# Patient Record
Sex: FEMALE | Race: WHITE | Employment: OTHER | URBAN - METROPOLITAN AREA
[De-identification: names, ages, dates, MRNs, and addresses within clinical notes are randomized per-mention and may not be internally consistent; named-entity substitution may affect disease eponyms.]

---
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
## 2019-02-11 ENCOUNTER — OFFICE VISIT (OUTPATIENT)
Dept: ORTHOPEDIC SURGERY | Age: 19
End: 2019-02-11
Payer: COMMERCIAL

## 2019-02-11 VITALS
HEIGHT: 67 IN | DIASTOLIC BLOOD PRESSURE: 75 MMHG | SYSTOLIC BLOOD PRESSURE: 122 MMHG | WEIGHT: 145 LBS | BODY MASS INDEX: 22.76 KG/M2 | HEART RATE: 87 BPM

## 2019-02-11 DIAGNOSIS — M25.512 ACUTE PAIN OF LEFT SHOULDER: Primary | ICD-10-CM

## 2019-02-11 PROCEDURE — 99243 OFF/OP CNSLTJ NEW/EST LOW 30: CPT | Performed by: ORTHOPAEDIC SURGERY

## 2019-02-14 ENCOUNTER — HOSPITAL ENCOUNTER (OUTPATIENT)
Dept: PHYSICAL THERAPY | Age: 19
Setting detail: THERAPIES SERIES
Discharge: HOME | End: 2019-02-14
Payer: COMMERCIAL

## 2019-02-14 PROCEDURE — G8984 CARRY CURRENT STATUS: HCPCS

## 2019-02-14 PROCEDURE — G0283 ELEC STIM OTHER THAN WOUND: HCPCS

## 2019-02-14 PROCEDURE — G8985 CARRY GOAL STATUS: HCPCS

## 2019-02-14 PROCEDURE — 97110 THERAPEUTIC EXERCISES: CPT

## 2019-02-14 PROCEDURE — 97161 PT EVAL LOW COMPLEX 20 MIN: CPT

## 2019-02-18 ENCOUNTER — HOSPITAL ENCOUNTER (OUTPATIENT)
Dept: PHYSICAL THERAPY | Age: 19
Setting detail: THERAPIES SERIES
Discharge: HOME | End: 2019-02-18
Payer: COMMERCIAL

## 2019-02-18 PROCEDURE — 97110 THERAPEUTIC EXERCISES: CPT

## 2019-02-18 PROCEDURE — G0283 ELEC STIM OTHER THAN WOUND: HCPCS

## 2019-02-18 PROCEDURE — 97140 MANUAL THERAPY 1/> REGIONS: CPT

## 2019-02-20 ENCOUNTER — HOSPITAL ENCOUNTER (OUTPATIENT)
Dept: PHYSICAL THERAPY | Age: 19
Setting detail: THERAPIES SERIES
End: 2019-02-20
Payer: COMMERCIAL

## 2019-02-22 ENCOUNTER — HOSPITAL ENCOUNTER (OUTPATIENT)
Dept: PHYSICAL THERAPY | Age: 19
Setting detail: THERAPIES SERIES
Discharge: HOME | End: 2019-02-22
Payer: COMMERCIAL

## 2019-02-22 PROCEDURE — G0283 ELEC STIM OTHER THAN WOUND: HCPCS

## 2019-02-22 PROCEDURE — 97110 THERAPEUTIC EXERCISES: CPT

## 2019-02-22 PROCEDURE — 97140 MANUAL THERAPY 1/> REGIONS: CPT

## 2021-04-15 ENCOUNTER — APPOINTMENT (OUTPATIENT)
Dept: GENERAL RADIOLOGY | Age: 21
End: 2021-04-15
Payer: COMMERCIAL

## 2021-04-15 ENCOUNTER — HOSPITAL ENCOUNTER (EMERGENCY)
Age: 21
Discharge: HOME | End: 2021-04-15
Attending: STUDENT IN AN ORGANIZED HEALTH CARE EDUCATION/TRAINING PROGRAM
Payer: COMMERCIAL

## 2021-04-15 ENCOUNTER — APPOINTMENT (OUTPATIENT)
Dept: CT IMAGING | Age: 21
End: 2021-04-15
Payer: COMMERCIAL

## 2021-04-15 VITALS
DIASTOLIC BLOOD PRESSURE: 70 MMHG | HEART RATE: 84 BPM | RESPIRATION RATE: 16 BRPM | HEIGHT: 67 IN | SYSTOLIC BLOOD PRESSURE: 112 MMHG | WEIGHT: 140 LBS | TEMPERATURE: 98.4 F | OXYGEN SATURATION: 98 % | BODY MASS INDEX: 21.97 KG/M2

## 2021-04-15 DIAGNOSIS — R07.9 CHEST PAIN, UNSPECIFIED TYPE: Primary | ICD-10-CM

## 2021-04-15 LAB
A/G RATIO: 1.5 (ref 1.1–2.2)
ALBUMIN SERPL-MCNC: 4.8 G/DL (ref 3.4–5)
ALP BLD-CCNC: 37 U/L (ref 40–129)
ALT SERPL-CCNC: 14 U/L (ref 10–40)
ANION GAP SERPL CALCULATED.3IONS-SCNC: 10 MMOL/L (ref 3–16)
AST SERPL-CCNC: 18 U/L (ref 15–37)
BASOPHILS ABSOLUTE: 0 K/UL (ref 0–0.2)
BASOPHILS RELATIVE PERCENT: 0.5 %
BILIRUB SERPL-MCNC: 0.4 MG/DL (ref 0–1)
BUN BLDV-MCNC: 9 MG/DL (ref 7–20)
CALCIUM SERPL-MCNC: 10.2 MG/DL (ref 8.3–10.6)
CHLORIDE BLD-SCNC: 104 MMOL/L (ref 99–110)
CO2: 25 MMOL/L (ref 21–32)
CREAT SERPL-MCNC: 0.7 MG/DL (ref 0.6–1.1)
D DIMER: 245 NG/ML DDU (ref 0–229)
EKG ATRIAL RATE: 114 BPM
EKG DIAGNOSIS: NORMAL
EKG P AXIS: 76 DEGREES
EKG P-R INTERVAL: 118 MS
EKG Q-T INTERVAL: 324 MS
EKG QRS DURATION: 82 MS
EKG QTC CALCULATION (BAZETT): 446 MS
EKG R AXIS: 92 DEGREES
EKG T AXIS: 71 DEGREES
EKG VENTRICULAR RATE: 114 BPM
EOSINOPHILS ABSOLUTE: 0 K/UL (ref 0–0.6)
EOSINOPHILS RELATIVE PERCENT: 0.4 %
GFR AFRICAN AMERICAN: >60
GFR NON-AFRICAN AMERICAN: >60
GLOBULIN: 3.1 G/DL
GLUCOSE BLD-MCNC: 106 MG/DL (ref 70–99)
HCG QUALITATIVE: NEGATIVE
HCT VFR BLD CALC: 44.8 % (ref 36–48)
HEMOGLOBIN: 15 G/DL (ref 12–16)
LYMPHOCYTES ABSOLUTE: 1.8 K/UL (ref 1–5.1)
LYMPHOCYTES RELATIVE PERCENT: 20.2 %
MCH RBC QN AUTO: 28.7 PG (ref 26–34)
MCHC RBC AUTO-ENTMCNC: 33.4 G/DL (ref 31–36)
MCV RBC AUTO: 86 FL (ref 80–100)
MONOCYTES ABSOLUTE: 0.5 K/UL (ref 0–1.3)
MONOCYTES RELATIVE PERCENT: 5.5 %
NEUTROPHILS ABSOLUTE: 6.5 K/UL (ref 1.7–7.7)
NEUTROPHILS RELATIVE PERCENT: 73.4 %
PDW BLD-RTO: 12.5 % (ref 12.4–15.4)
PLATELET # BLD: 288 K/UL (ref 135–450)
PMV BLD AUTO: 9.1 FL (ref 5–10.5)
POTASSIUM SERPL-SCNC: 4.5 MMOL/L (ref 3.5–5.1)
RBC # BLD: 5.21 M/UL (ref 4–5.2)
SODIUM BLD-SCNC: 139 MMOL/L (ref 136–145)
TOTAL PROTEIN: 7.9 G/DL (ref 6.4–8.2)
TROPONIN: <0.01 NG/ML
WBC # BLD: 8.8 K/UL (ref 4–11)

## 2021-04-15 PROCEDURE — 85025 COMPLETE CBC W/AUTO DIFF WBC: CPT

## 2021-04-15 PROCEDURE — 36415 COLL VENOUS BLD VENIPUNCTURE: CPT

## 2021-04-15 PROCEDURE — 71046 X-RAY EXAM CHEST 2 VIEWS: CPT

## 2021-04-15 PROCEDURE — 6360000004 HC RX CONTRAST MEDICATION: Performed by: STUDENT IN AN ORGANIZED HEALTH CARE EDUCATION/TRAINING PROGRAM

## 2021-04-15 PROCEDURE — 2580000003 HC RX 258: Performed by: STUDENT IN AN ORGANIZED HEALTH CARE EDUCATION/TRAINING PROGRAM

## 2021-04-15 PROCEDURE — 93010 ELECTROCARDIOGRAM REPORT: CPT | Performed by: INTERNAL MEDICINE

## 2021-04-15 PROCEDURE — 96374 THER/PROPH/DIAG INJ IV PUSH: CPT

## 2021-04-15 PROCEDURE — 93005 ELECTROCARDIOGRAM TRACING: CPT | Performed by: STUDENT IN AN ORGANIZED HEALTH CARE EDUCATION/TRAINING PROGRAM

## 2021-04-15 PROCEDURE — 99283 EMERGENCY DEPT VISIT LOW MDM: CPT

## 2021-04-15 PROCEDURE — 84484 ASSAY OF TROPONIN QUANT: CPT

## 2021-04-15 PROCEDURE — 85379 FIBRIN DEGRADATION QUANT: CPT

## 2021-04-15 PROCEDURE — 84703 CHORIONIC GONADOTROPIN ASSAY: CPT

## 2021-04-15 PROCEDURE — 80053 COMPREHEN METABOLIC PANEL: CPT

## 2021-04-15 PROCEDURE — 6360000002 HC RX W HCPCS: Performed by: STUDENT IN AN ORGANIZED HEALTH CARE EDUCATION/TRAINING PROGRAM

## 2021-04-15 PROCEDURE — 71260 CT THORAX DX C+: CPT

## 2021-04-15 RX ORDER — 0.9 % SODIUM CHLORIDE 0.9 %
1000 INTRAVENOUS SOLUTION INTRAVENOUS ONCE
Status: COMPLETED | OUTPATIENT
Start: 2021-04-15 | End: 2021-04-15

## 2021-04-15 RX ORDER — KETOROLAC TROMETHAMINE 30 MG/ML
15 INJECTION, SOLUTION INTRAMUSCULAR; INTRAVENOUS ONCE
Status: COMPLETED | OUTPATIENT
Start: 2021-04-15 | End: 2021-04-15

## 2021-04-15 RX ADMIN — IOPAMIDOL 75 ML: 755 INJECTION, SOLUTION INTRAVENOUS at 15:55

## 2021-04-15 RX ADMIN — KETOROLAC TROMETHAMINE 15 MG: 30 INJECTION, SOLUTION INTRAMUSCULAR; INTRAVENOUS at 17:28

## 2021-04-15 RX ADMIN — SODIUM CHLORIDE 1000 ML: 9 INJECTION, SOLUTION INTRAVENOUS at 16:30

## 2021-04-15 ASSESSMENT — HEART SCORE: ECG: 0

## 2021-04-15 NOTE — ED NOTES
Discharge instructions reviewed with Pt. Pt verbalizes understanding at this time. VS as noted. Pt condition stable at this time. No concerns voiced.       Kayley Bennett RN  04/15/21 4247

## 2021-04-15 NOTE — ED NOTES
Discharge instructions reviewed with Pt. Pt verbalizes understanding at this time. VS as noted. Pt condition stable at this time. No concerns voiced.       Isela Ng RN  04/15/21 8394

## 2021-04-15 NOTE — ED PROVIDER NOTES
[facility] EMERGENCY DEPARTMENT      CHIEF COMPLAINT  Chest Pain (Chest pain intermittent over the past 3 days, pt also reports shortness of breath, and feeling lightheaded. Pt reports the lightheaded sensation she has experienced for an extended period of time. )       HISTORY OF PRESENT ILLNESS  Dimitri Hirsch is a 24 y.o. female  who presents to the ED complaining of left-sided chest pain and difficulty taking a deep breath. Patient states that the pain has been intermittent over the past 3 days, now exacerbating relieving factors. She states that she has mild dry cough but this is baseline for her. She denies any hemoptysis. She states that she has also had a small rash on the center of her chest, however this is been present for the past several months. She denies any exacerbating relieving factors. She has not taken medication for symptoms. She denies any other complaints or concerns. Denies leg pain or leg swelling. No other complaints, modifying factors or associated symptoms. I have reviewed the following from the nursing documentation. History reviewed. No pertinent past medical history. History reviewed. No pertinent surgical history. History reviewed. No pertinent family history.   Social History     Socioeconomic History    Marital status: Single     Spouse name: Not on file    Number of children: Not on file    Years of education: Not on file    Highest education level: Not on file   Occupational History    Not on file   Social Needs    Financial resource strain: Not on file    Food insecurity     Worry: Not on file     Inability: Not on file    Transportation needs     Medical: Not on file     Non-medical: Not on file   Tobacco Use    Smoking status: Current Every Day Smoker     Types: E-Cigarettes    Smokeless tobacco: Never Used   Substance and Sexual Activity    Alcohol use: Yes     Comment: occ    Drug use: Not Currently    Sexual activity: Not on file   Lifestyle  Physical activity     Days per week: Not on file     Minutes per session: Not on file    Stress: Not on file   Relationships    Social connections     Talks on phone: Not on file     Gets together: Not on file     Attends [employer] service: Not on file     Active member of club or organization: Not on file     Attends meetings of clubs or organizations: Not on file     Relationship status: Not on file    Intimate partner violence     Fear of current or ex partner: Not on file     Emotionally abused: Not on file     Physically abused: Not on file     Forced sexual activity: Not on file   Other Topics Concern    Not on file   Social History Narrative    Not on file     Current Facility-Administered Medications   Medication Dose Route Frequency Provider Last Rate Last Admin    0.9 % sodium chloride bolus  1,000 mL Intravenous Once Michelle Ramos MD 1,000 mL/hr at 04/15/21 1630 1,000 mL at 04/15/21 1630    ketorolac (TORADOL) injection 15 mg  15 mg Intravenous Once Michelle Ramos MD         Current Outpatient Medications   Medication Sig Dispense Refill    NONFORMULARY Birth control       No Known Allergies    REVIEW OF SYSTEMS  10 systems reviewed, pertinent positives per HPI otherwise noted to be negative. PHYSICAL EXAM  BP 99/71   Pulse 96   Temp 98.4 °F (36.9 °C) (Oral)   Resp 16   Ht 5' 7\" (1.702 m)   Wt 140 lb (63.5 kg)   LMP 04/01/2021   SpO2 99%   Breastfeeding Unknown   BMI 21.93 kg/m²    GENERAL APPEARANCE: Awake and alert. Cooperative. No acute distress. HENT: Normocephalic. Atraumatic. NECK: Supple. EYES: PERRL. EOM's grossly intact. HEART/CHEST: RRR. No murmurs. LUNGS: Respirations unlabored. CTAB. Good air exchange. Speaking comfortably in full sentences. ABDOMEN: No tenderness. Soft. Non-distended. No masses. No organomegaly. No guarding or rebound. MUSCULOSKELETAL: No extremity edema. Compartments soft. No deformity. No tenderness in the extremities.   All extremities neurovascularly intact. SKIN: Warm and dry. Small, raised rash on center of chest, no significant erythema, nontender, no petechiae or purpura. NEUROLOGICAL: Alert and oriented. CN's 2-12 intact. No gross facial drooping. Strength 5/5, sensation intact. PSYCHIATRIC: Normal mood and affect. LABS  I have reviewed all labs for this visit.    Results for orders placed or performed during the hospital encounter of 04/15/21   CBC Auto Differential   Result Value Ref Range    WBC 8.8 4.0 - 11.0 K/uL    RBC 5.21 (H) 4.00 - 5.20 M/uL    Hemoglobin 15.0 12.0 - 16.0 g/dL    Hematocrit 44.8 36.0 - 48.0 %    MCV 86.0 80.0 - 100.0 fL    MCH 28.7 26.0 - 34.0 pg    MCHC 33.4 31.0 - 36.0 g/dL    RDW 12.5 12.4 - 15.4 %    Platelets 194 947 - 628 K/uL    MPV 9.1 5.0 - 10.5 fL    Neutrophils % 73.4 %    Lymphocytes % 20.2 %    Monocytes % 5.5 %    Eosinophils % 0.4 %    Basophils % 0.5 %    Neutrophils Absolute 6.5 1.7 - 7.7 K/uL    Lymphocytes Absolute 1.8 1.0 - 5.1 K/uL    Monocytes Absolute 0.5 0.0 - 1.3 K/uL    Eosinophils Absolute 0.0 0.0 - 0.6 K/uL    Basophils Absolute 0.0 0.0 - 0.2 K/uL   Comprehensive Metabolic Panel   Result Value Ref Range    Sodium 139 136 - 145 mmol/L    Potassium 4.5 3.5 - 5.1 mmol/L    Chloride 104 99 - 110 mmol/L    CO2 25 21 - 32 mmol/L    Anion Gap 10 3 - 16    Glucose 106 (H) 70 - 99 mg/dL    BUN 9 7 - 20 mg/dL    CREATININE 0.7 0.6 - 1.1 mg/dL    GFR Non-African American >60 >60    GFR African American >60 >60    Calcium 10.2 8.3 - 10.6 mg/dL    Total Protein 7.9 6.4 - 8.2 g/dL    Albumin 4.8 3.4 - 5.0 g/dL    Albumin/Globulin Ratio 1.5 1.1 - 2.2    Total Bilirubin 0.4 0.0 - 1.0 mg/dL    Alkaline Phosphatase 37 (L) 40 - 129 U/L    ALT 14 10 - 40 U/L    AST 18 15 - 37 U/L    Globulin 3.1 g/dL   HCG Qualitative, Serum   Result Value Ref Range    hCG Qual Negative Detects HCG level >10 MIU/mL   Troponin   Result Value Ref Range    Troponin <0.01 <0.01 ng/mL   D-dimer, quantitative Result Value Ref Range    D-Dimer, Quant 245 (H) 0 - 229 ng/mL DDU       ECG  The Ekg interpreted by me shows  sinus tachycardia, tfjj=200   Axis is   Right axis deviation  QTc is  normal  Intervals and Durations are unremarkable. ST Segments: normal  No previous available for comparison    RADIOLOGY  CT CHEST PULMONARY EMBOLISM W CONTRAST   Final Result   No pulmonary embolus is identified. No acute cardiopulmonary disease. XR CHEST (2 VW)   Final Result   No acute cardiopulmonary disease. ED COURSE/MDM  Patient seen and evaluated. Old records reviewed. Labs and imaging reviewed and results discussed with patient. Patient is a 18-year-old female, presenting with concerns for intermittent substernal chest pain that sharp over the past 3 days, also with some shortness of breath and lightheadedness. Full HPI as detailed above. Upon arrival in the ED, vitals reassuring. Patient resting comfortably. She is in no acute distress. Heart regular rate and rhythm. Lungs clear to auscultation bilaterally. Patient is afebrile and appears nontoxic EKG shows sinus tachycardia and a right axis deviation but no evidence of acute ischemia or arrhythmias. Troponin negative. No acute concerning electrolyte abnormalities. No leukocytosis or anemia. D-dimer was found to be elevated, CT with PE protocol was performed which did not reveal any pulmonary embolus and no acute cardiopulmonary disease. Patient's heart score is 0. She was reassessed and stated that she was feeling well. I did offer her follow-up with primary care physician and she is comfortable in agreement with plan of care. She will be given referral.  He is given precautions for the ED.     During the patient's ED course, the patient was given:  Medications   0.9 % sodium chloride bolus (1,000 mLs Intravenous New Bag 4/15/21 1630)   ketorolac (TORADOL) injection 15 mg (has no administration in time range)   iopamidol (ISOVUE-370) 76 % injection 75 mL (75 mLs Intravenous Given 4/15/21 1565)        CLINICAL IMPRESSION  1. Chest pain, unspecified type        Blood pressure 99/71, pulse 96, temperature 98.4 °F (36.9 °C), temperature source Oral, resp. rate 16, height 5' 7\" (1.702 m), weight 140 lb (63.5 kg), last menstrual period 04/01/2021, SpO2 99 %, unknown if currently breastfeeding. DISPOSITION  Dez Hilario was discharged to home in good condition. Patient was given scripts for the following medications. I counseled patient how to take these medications. New Prescriptions    No medications on file       Follow-up with:  [facility]) Pre-Services  [phone]  Schedule an appointment as soon as possible for a visit       [facility]  [address] [place] [address],[address]  [phone]  Go to   If symptoms worsen      DISCLAIMER: This chart was created using Dragon dictation software. Efforts were made by me to ensure accuracy, however some errors may be present due to limitations of this technology and occasionally words are not transcribed correctly.        Vernon Lopez MD  04/15/21 5920

## 2021-09-14 LAB
ABO, EXTERNAL RESULT: NORMAL
C. TRACHOMATIS, EXTERNAL RESULT: NEGATIVE
HEP B, EXTERNAL RESULT: NEGATIVE
HEPATITIS C ANTIBODY, EXTERNAL RESULT: NEGATIVE
HIV, EXTERNAL RESULT: NON REACTIVE
N. GONORRHOEAE, EXTERNAL RESULT: NEGATIVE
RH FACTOR, EXTERNAL RESULT: POSITIVE
RUBELLA TITER, EXTERNAL RESULT: NORMAL

## 2022-03-16 ENCOUNTER — HOSPITAL ENCOUNTER (OUTPATIENT)
Age: 22
Discharge: HOME | End: 2022-03-17
Attending: OBSTETRICS & GYNECOLOGY | Admitting: OBSTETRICS & GYNECOLOGY
Payer: COMMERCIAL

## 2022-03-16 VITALS
BODY MASS INDEX: 30.76 KG/M2 | HEIGHT: 67 IN | WEIGHT: 196 LBS | HEART RATE: 116 BPM | TEMPERATURE: 98.4 F | DIASTOLIC BLOOD PRESSURE: 77 MMHG | SYSTOLIC BLOOD PRESSURE: 116 MMHG | RESPIRATION RATE: 18 BRPM

## 2022-03-16 PROCEDURE — 81003 URINALYSIS AUTO W/O SCOPE: CPT

## 2022-03-16 RX ORDER — SODIUM CHLORIDE, SODIUM LACTATE, POTASSIUM CHLORIDE, AND CALCIUM CHLORIDE .6; .31; .03; .02 G/100ML; G/100ML; G/100ML; G/100ML
500 INJECTION, SOLUTION INTRAVENOUS ONCE
Status: COMPLETED | OUTPATIENT
Start: 2022-03-16 | End: 2022-03-17

## 2022-03-17 PROBLEM — O47.00 THREATENED PRETERM LABOR: Status: ACTIVE | Noted: 2022-03-17

## 2022-03-17 LAB
BILIRUBIN URINE: NEGATIVE
BLOOD, URINE: NEGATIVE
CLARITY: CLEAR
COLOR: YELLOW
GLUCOSE URINE: NEGATIVE MG/DL
KETONES, URINE: NEGATIVE MG/DL
LEUKOCYTE ESTERASE, URINE: NEGATIVE
MICROSCOPIC EXAMINATION: NORMAL
NITRITE, URINE: NEGATIVE
PH UA: 6 (ref 5–8)
PROTEIN UA: NEGATIVE MG/DL
SPECIFIC GRAVITY UA: <=1.005 (ref 1–1.03)
URINE TYPE: NORMAL
UROBILINOGEN, URINE: 0.2 E.U./DL

## 2022-03-17 PROCEDURE — 2580000003 HC RX 258: Performed by: OBSTETRICS & GYNECOLOGY

## 2022-03-17 PROCEDURE — 99211 OFF/OP EST MAY X REQ PHY/QHP: CPT

## 2022-03-17 PROCEDURE — 96361 HYDRATE IV INFUSION ADD-ON: CPT

## 2022-03-17 PROCEDURE — 6360000002 HC RX W HCPCS: Performed by: OBSTETRICS & GYNECOLOGY

## 2022-03-17 PROCEDURE — 96360 HYDRATION IV INFUSION INIT: CPT

## 2022-03-17 RX ORDER — DEXTROSE, SODIUM CHLORIDE, SODIUM LACTATE, POTASSIUM CHLORIDE, AND CALCIUM CHLORIDE 5; .6; .31; .03; .02 G/100ML; G/100ML; G/100ML; G/100ML; G/100ML
INJECTION, SOLUTION INTRAVENOUS CONTINUOUS
Status: DISCONTINUED | OUTPATIENT
Start: 2022-03-17 | End: 2022-03-17 | Stop reason: HOSPADM

## 2022-03-17 RX ORDER — BETAMETHASONE SODIUM PHOSPHATE AND BETAMETHASONE ACETATE 3; 3 MG/ML; MG/ML
12 INJECTION, SUSPENSION INTRA-ARTICULAR; INTRALESIONAL; INTRAMUSCULAR; SOFT TISSUE EVERY 24 HOURS
Status: DISCONTINUED | OUTPATIENT
Start: 2022-03-17 | End: 2022-03-17 | Stop reason: HOSPADM

## 2022-03-17 RX ADMIN — SODIUM CHLORIDE, POTASSIUM CHLORIDE, SODIUM LACTATE AND CALCIUM CHLORIDE 500 ML: 600; 310; 30; 20 INJECTION, SOLUTION INTRAVENOUS at 00:06

## 2022-03-17 RX ADMIN — SODIUM CHLORIDE, SODIUM LACTATE, POTASSIUM CHLORIDE, CALCIUM CHLORIDE AND DEXTROSE MONOHYDRATE: 5; 600; 310; 30; 20 INJECTION, SOLUTION INTRAVENOUS at 02:07

## 2022-03-17 RX ADMIN — BETAMETHASONE SODIUM PHOSPHATE AND BETAMETHASONE ACETATE 12 MG: 3; 3 INJECTION, SUSPENSION INTRA-ARTICULAR; INTRALESIONAL; INTRAMUSCULAR at 00:43

## 2022-03-17 NOTE — PROGRESS NOTES
Dr. Damaso Donaldson at bedside. Cervix unchanged. Patient to be discharged home, then follow up at appointment at 0900 Friday at [facility] in [place]. She said to come here before the appointment to get celestone injection at 0800.  Patient taken off monitor at this time

## 2022-03-17 NOTE — PROGRESS NOTES
House Physician Progress Note    S:   Called to evaluate patient as attending physician in delivery and desire to promptly rule out  labor/ROM. Patient reports regular contractions that started at 13:30 this afternoon. States they started every 15-20 minutes and have progressively gotten closer together. Reports over the last hour she has noticed that she is more \"wet\" vaginally. Denies gushes of fluid or need to wear a pad. +FM. Reports pregnancy is complicated by polyhydramnios. O:   Exam:   GEN: NAD  Lungs: Respirations unlabored  Abd: Soft, non-tender, non-distended. No rebound or guarding. No uterine tenderness to palpation. Pelvic: Normal appearing external genitalia. Negative ferning and pooling. Cervix /-3    Fetal Monitoring Interpretation:   Baseline: 140-150 (Wandering baseline)  Variability: moderate in degree  Accelerations: Present  Decelerations: Absent                  [place]: Contractions are present every 2-3 minutes    Category 1    Reactive: Yes    A:   1. Markell Bales is a 20 y.o.  at 34w3d   2. Membranes intact on exam  3. Polyhydramnios  4. Fetal wellbeing: NST reactive    P:   Discussed with Dr. Damaso Donaldson who is here to personally evaluate patient.      Priscila Jensen,

## 2022-03-17 NOTE — PROGRESS NOTES
Dr. Mario Madera notified that IV bolus was finished running and that patient stated her contractions felt like they were getting stronger

## 2022-03-17 NOTE — PROGRESS NOTES
Pt arrives to triage with complaints of CTX that began around 1300 but have gotten stronger and Q3 since 2000. Pt also states that in the last hour she \"feels more wet\" but denies large gush of flush. Pt states she is poly and ESTELA was 29 at yesterdays ultrasound. CTX palpate mild/moderate and pt able to talk through some and breathe through others. Denies VB or recent sexual intercourse. States +FM.  in house but in delivery.  States to have house doc perform Meljose and VE.

## 2022-03-17 NOTE — PROGRESS NOTES
Pt verbalized understanding of verbal and written discharge instructions and denies having questions at this time. Pt left OB unit at 0439 ambulatory, undelivered, and in stable condition, accompanied by FOB. Patient is not in active labor. Celestone vial labeled with patient sticker and placed in [address] drop box for when patient returns for 2nd injection.

## 2022-03-17 NOTE — H&P
Department of Obstetrics and Gynecology  Labor and Delivery  Attending Triage Note      SUBJECTIVE:  contractions    OBJECTIVE    22y  at 34w4d presented to L&D eliezer after calling answering service at 2030 c/o ctx since 1300. Pt was advised to take tylenol 1g and if no improvement to call back,  She called back at 2130 reporting increasing frequency and intensity q3-4 mins. She also reports urinary sx of incomplete emptying and frequency. Unsure if having LOF. Reports good FM, +CTX, VB. Vitals:  Vitals:    22 2238 22   BP: 116/77    Pulse: 116    Resp: 18    Temp: 98.4 °F (36.9 °C)    TempSrc: Oral    Weight:  196 lb (88.9 kg)   Height:  5' 7\" (1.702 m)       CONSTITUTIONAL:  awake, alert, cooperative, no apparent distress, and appears stated age  ABDOMEN:  Soft non tender  MUSCULOSKELETAL:  Full range of motion    Cervix:  20/-3 (Exam per House MD/), fern neg    Membranes:  Intact    Fetal heart rate:         Baseline Heart Rate:  150 bpm       Accelerations:  present       Decelerations:  absent       Variability:  moderate    Contraction frequency: q3-4 minutes      DATA:  Results for orders placed or performed during the hospital encounter of 22   Urinalysis   Result Value Ref Range    Urine Type NotGiven      Pending       ASSESSMENT & PLAN:      IUP at 34.4 weeks   contractions  No evidence of ruptured membranes  Reassuring fetal status     1. Pt evaluated and cervix 1/L/H fern neg, pt states discomfort minimal.  Suspect threatened  labor. IVF started. Send UA to r/o dehydration vs UTI as cause of sx as well- treat prn. Plan Celestone 12mg x1 IM and repeat 2nd dose in 24h. Plan cervical recheck in 2 hours with SVE. Monitor closely.        Lane Weiner DO

## 2022-03-18 ENCOUNTER — HOSPITAL ENCOUNTER (OUTPATIENT)
Age: 22
Discharge: HOME | End: 2022-03-18
Attending: OBSTETRICS & GYNECOLOGY | Admitting: OBSTETRICS & GYNECOLOGY
Payer: COMMERCIAL

## 2022-03-18 PROCEDURE — 99211 OFF/OP EST MAY X REQ PHY/QHP: CPT

## 2022-03-18 PROCEDURE — 96372 THER/PROPH/DIAG INJ SC/IM: CPT

## 2022-03-18 PROCEDURE — 6360000002 HC RX W HCPCS: Performed by: OBSTETRICS & GYNECOLOGY

## 2022-03-18 RX ORDER — BETAMETHASONE SODIUM PHOSPHATE AND BETAMETHASONE ACETATE 3; 3 MG/ML; MG/ML
12 INJECTION, SUSPENSION INTRA-ARTICULAR; INTRALESIONAL; INTRAMUSCULAR; SOFT TISSUE EVERY 24 HOURS
Status: DISCONTINUED | OUTPATIENT
Start: 2022-03-18 | End: 2022-03-18 | Stop reason: HOSPADM

## 2022-03-18 RX ADMIN — BETAMETHASONE ACETATE AND BETAMETHASONE SODIUM PHOSPHATE 12 MG: 3; 3 INJECTION, SUSPENSION INTRA-ARTICULAR; INTRALESIONAL; INTRAMUSCULAR; SOFT TISSUE at 08:12

## 2022-03-18 NOTE — PROGRESS NOTES
Pt here for second Celestone shot. Given in right GM. Tolerated well.    0800 Discharged not in labor. Pt states she has OB appointment with NST this morning.
No

## 2022-03-24 LAB — GBS, EXTERNAL RESULT: NEGATIVE

## 2022-03-31 LAB — RPR, EXTERNAL RESULT: NON REACTIVE

## 2022-04-10 ENCOUNTER — ANESTHESIA (OUTPATIENT)
Dept: LABOR AND DELIVERY | Age: 22
End: 2022-04-10
Payer: COMMERCIAL

## 2022-04-10 ENCOUNTER — ANESTHESIA EVENT (OUTPATIENT)
Dept: LABOR AND DELIVERY | Age: 22
End: 2022-04-10
Payer: COMMERCIAL

## 2022-04-10 ENCOUNTER — HOSPITAL ENCOUNTER (INPATIENT)
Age: 22
LOS: 2 days | Discharge: HOME | End: 2022-04-12
Attending: OBSTETRICS & GYNECOLOGY | Admitting: OBSTETRICS & GYNECOLOGY
Payer: COMMERCIAL

## 2022-04-10 PROBLEM — Z37.9 NORMAL LABOR: Status: ACTIVE | Noted: 2022-04-10

## 2022-04-10 LAB
ABO/RH: NORMAL
AMPHETAMINE SCREEN, URINE: ABNORMAL
ANTIBODY SCREEN: NORMAL
BARBITURATE SCREEN URINE: ABNORMAL
BASOPHILS ABSOLUTE: 0.1 K/UL (ref 0–0.2)
BASOPHILS RELATIVE PERCENT: 0.6 %
BENZODIAZEPINE SCREEN, URINE: ABNORMAL
BUPRENORPHINE URINE: ABNORMAL
CANNABINOID SCREEN URINE: POSITIVE
COCAINE METABOLITE SCREEN URINE: ABNORMAL
EOSINOPHILS ABSOLUTE: 0 K/UL (ref 0–0.6)
EOSINOPHILS RELATIVE PERCENT: 0.3 %
HCT VFR BLD CALC: 34.7 % (ref 36–48)
HEMOGLOBIN: 11.2 G/DL (ref 12–16)
LYMPHOCYTES ABSOLUTE: 2.1 K/UL (ref 1–5.1)
LYMPHOCYTES RELATIVE PERCENT: 15.1 %
Lab: ABNORMAL
MCH RBC QN AUTO: 26.4 PG (ref 26–34)
MCHC RBC AUTO-ENTMCNC: 32.3 G/DL (ref 31–36)
MCV RBC AUTO: 81.5 FL (ref 80–100)
METHADONE SCREEN, URINE: ABNORMAL
MONOCYTES ABSOLUTE: 0.7 K/UL (ref 0–1.3)
MONOCYTES RELATIVE PERCENT: 5.1 %
NEUTROPHILS ABSOLUTE: 11.1 K/UL (ref 1.7–7.7)
NEUTROPHILS RELATIVE PERCENT: 78.9 %
OPIATE SCREEN URINE: ABNORMAL
OXYCODONE URINE: ABNORMAL
PDW BLD-RTO: 15.7 % (ref 12.4–15.4)
PH UA: 4
PHENCYCLIDINE SCREEN URINE: ABNORMAL
PLATELET # BLD: 339 K/UL (ref 135–450)
PMV BLD AUTO: 9.9 FL (ref 5–10.5)
PROPOXYPHENE SCREEN: ABNORMAL
RBC # BLD: 4.25 M/UL (ref 4–5.2)
SARS-COV-2, NAAT: NOT DETECTED
WBC # BLD: 14 K/UL (ref 4–11)

## 2022-04-10 PROCEDURE — 87635 SARS-COV-2 COVID-19 AMP PRB: CPT

## 2022-04-10 PROCEDURE — 0KQM0ZZ REPAIR PERINEUM MUSCLE, OPEN APPROACH: ICD-10-PCS | Performed by: OBSTETRICS & GYNECOLOGY

## 2022-04-10 PROCEDURE — 86780 TREPONEMA PALLIDUM: CPT

## 2022-04-10 PROCEDURE — 85025 COMPLETE CBC W/AUTO DIFF WBC: CPT

## 2022-04-10 PROCEDURE — 86850 RBC ANTIBODY SCREEN: CPT

## 2022-04-10 PROCEDURE — 2500000003 HC RX 250 WO HCPCS: Performed by: NURSE ANESTHETIST, CERTIFIED REGISTERED

## 2022-04-10 PROCEDURE — 86900 BLOOD TYPING SEROLOGIC ABO: CPT

## 2022-04-10 PROCEDURE — 86901 BLOOD TYPING SEROLOGIC RH(D): CPT

## 2022-04-10 PROCEDURE — 7200000001 HC VAGINAL DELIVERY

## 2022-04-10 PROCEDURE — 1220000000 HC SEMI PRIVATE OB R&B

## 2022-04-10 PROCEDURE — 6370000000 HC RX 637 (ALT 250 FOR IP): Performed by: OBSTETRICS & GYNECOLOGY

## 2022-04-10 PROCEDURE — 3700000025 EPIDURAL BLOCK: Performed by: ANESTHESIOLOGY

## 2022-04-10 PROCEDURE — 80307 DRUG TEST PRSMV CHEM ANLYZR: CPT

## 2022-04-10 PROCEDURE — 2580000003 HC RX 258: Performed by: OBSTETRICS & GYNECOLOGY

## 2022-04-10 RX ORDER — SODIUM CHLORIDE 9 MG/ML
25 INJECTION, SOLUTION INTRAVENOUS PRN
Status: DISCONTINUED | OUTPATIENT
Start: 2022-04-10 | End: 2022-04-10

## 2022-04-10 RX ORDER — ACETAMINOPHEN 500 MG
1000 TABLET ORAL EVERY 6 HOURS
Status: DISCONTINUED | OUTPATIENT
Start: 2022-04-10 | End: 2022-04-12 | Stop reason: HOSPADM

## 2022-04-10 RX ORDER — SODIUM CHLORIDE, SODIUM LACTATE, POTASSIUM CHLORIDE, CALCIUM CHLORIDE 600; 310; 30; 20 MG/100ML; MG/100ML; MG/100ML; MG/100ML
INJECTION, SOLUTION INTRAVENOUS CONTINUOUS
Status: DISCONTINUED | OUTPATIENT
Start: 2022-04-10 | End: 2022-04-10

## 2022-04-10 RX ORDER — BUPIVACAINE HYDROCHLORIDE 2.5 MG/ML
INJECTION, SOLUTION EPIDURAL; INFILTRATION; INTRACAUDAL
Status: COMPLETED
Start: 2022-04-10 | End: 2022-04-10

## 2022-04-10 RX ORDER — SODIUM CHLORIDE 0.9 % (FLUSH) 0.9 %
5-40 SYRINGE (ML) INJECTION PRN
Status: DISCONTINUED | OUTPATIENT
Start: 2022-04-10 | End: 2022-04-10

## 2022-04-10 RX ORDER — BUPIVACAINE HYDROCHLORIDE 2.5 MG/ML
INJECTION, SOLUTION EPIDURAL; INFILTRATION; INTRACAUDAL PRN
Status: DISCONTINUED | OUTPATIENT
Start: 2022-04-10 | End: 2022-04-10 | Stop reason: SDUPTHER

## 2022-04-10 RX ORDER — SODIUM CHLORIDE, SODIUM LACTATE, POTASSIUM CHLORIDE, AND CALCIUM CHLORIDE .6; .31; .03; .02 G/100ML; G/100ML; G/100ML; G/100ML
500 INJECTION, SOLUTION INTRAVENOUS PRN
Status: DISCONTINUED | OUTPATIENT
Start: 2022-04-10 | End: 2022-04-10

## 2022-04-10 RX ORDER — SODIUM CHLORIDE, SODIUM LACTATE, POTASSIUM CHLORIDE, CALCIUM CHLORIDE 600; 310; 30; 20 MG/100ML; MG/100ML; MG/100ML; MG/100ML
INJECTION, SOLUTION INTRAVENOUS CONTINUOUS
Status: ACTIVE | OUTPATIENT
Start: 2022-04-10 | End: 2022-04-10

## 2022-04-10 RX ORDER — SODIUM CHLORIDE 0.9 % (FLUSH) 0.9 %
5-40 SYRINGE (ML) INJECTION EVERY 12 HOURS SCHEDULED
Status: DISCONTINUED | OUTPATIENT
Start: 2022-04-10 | End: 2022-04-12 | Stop reason: HOSPADM

## 2022-04-10 RX ORDER — ONDANSETRON 2 MG/ML
4 INJECTION INTRAMUSCULAR; INTRAVENOUS EVERY 6 HOURS PRN
Status: DISCONTINUED | OUTPATIENT
Start: 2022-04-10 | End: 2022-04-10

## 2022-04-10 RX ORDER — SODIUM CHLORIDE, SODIUM LACTATE, POTASSIUM CHLORIDE, AND CALCIUM CHLORIDE .6; .31; .03; .02 G/100ML; G/100ML; G/100ML; G/100ML
1000 INJECTION, SOLUTION INTRAVENOUS PRN
Status: DISCONTINUED | OUTPATIENT
Start: 2022-04-10 | End: 2022-04-10

## 2022-04-10 RX ORDER — FAMOTIDINE 20 MG/1
20 TABLET, FILM COATED ORAL 2 TIMES DAILY
Status: DISCONTINUED | OUTPATIENT
Start: 2022-04-10 | End: 2022-04-12 | Stop reason: HOSPADM

## 2022-04-10 RX ORDER — DOCUSATE SODIUM 100 MG/1
100 CAPSULE, LIQUID FILLED ORAL 2 TIMES DAILY
Status: DISCONTINUED | OUTPATIENT
Start: 2022-04-10 | End: 2022-04-12 | Stop reason: HOSPADM

## 2022-04-10 RX ORDER — LANOLIN 100 %
OINTMENT (GRAM) TOPICAL PRN
Status: DISCONTINUED | OUTPATIENT
Start: 2022-04-10 | End: 2022-04-12 | Stop reason: HOSPADM

## 2022-04-10 RX ORDER — SODIUM CHLORIDE 9 MG/ML
25 INJECTION, SOLUTION INTRAVENOUS PRN
Status: DISCONTINUED | OUTPATIENT
Start: 2022-04-10 | End: 2022-04-12 | Stop reason: HOSPADM

## 2022-04-10 RX ORDER — ACETAMINOPHEN 325 MG/1
650 TABLET ORAL EVERY 4 HOURS PRN
Status: DISCONTINUED | OUTPATIENT
Start: 2022-04-10 | End: 2022-04-10

## 2022-04-10 RX ORDER — IBUPROFEN 800 MG/1
800 TABLET ORAL EVERY 8 HOURS
Status: DISCONTINUED | OUTPATIENT
Start: 2022-04-11 | End: 2022-04-12 | Stop reason: HOSPADM

## 2022-04-10 RX ORDER — SODIUM CHLORIDE 0.9 % (FLUSH) 0.9 %
5-40 SYRINGE (ML) INJECTION EVERY 12 HOURS SCHEDULED
Status: DISCONTINUED | OUTPATIENT
Start: 2022-04-10 | End: 2022-04-10

## 2022-04-10 RX ORDER — BUPIVACAINE HYDROCHLORIDE 5 MG/ML
INJECTION, SOLUTION EPIDURAL; INTRACAUDAL
Status: DISCONTINUED
Start: 2022-04-10 | End: 2022-04-10

## 2022-04-10 RX ORDER — DIPHENHYDRAMINE HCL 25 MG
25 TABLET ORAL EVERY 4 HOURS PRN
Status: DISCONTINUED | OUTPATIENT
Start: 2022-04-10 | End: 2022-04-10

## 2022-04-10 RX ORDER — SODIUM CHLORIDE 0.9 % (FLUSH) 0.9 %
5-40 SYRINGE (ML) INJECTION PRN
Status: DISCONTINUED | OUTPATIENT
Start: 2022-04-10 | End: 2022-04-12 | Stop reason: HOSPADM

## 2022-04-10 RX ADMIN — Medication 10 ML: at 20:45

## 2022-04-10 RX ADMIN — Medication 500 ML: at 10:47

## 2022-04-10 RX ADMIN — ACETAMINOPHEN 1000 MG: 500 TABLET ORAL at 15:36

## 2022-04-10 RX ADMIN — ACETAMINOPHEN 1000 MG: 500 TABLET ORAL at 20:43

## 2022-04-10 RX ADMIN — SODIUM CHLORIDE, POTASSIUM CHLORIDE, SODIUM LACTATE AND CALCIUM CHLORIDE: 600; 310; 30; 20 INJECTION, SOLUTION INTRAVENOUS at 06:19

## 2022-04-10 RX ADMIN — BUPIVACAINE HYDROCHLORIDE 2 ML: 2.5 INJECTION, SOLUTION EPIDURAL; INFILTRATION; INTRACAUDAL; PERINEURAL at 07:13

## 2022-04-10 RX ADMIN — Medication 15 ML/HR: at 07:18

## 2022-04-10 RX ADMIN — SODIUM CHLORIDE, POTASSIUM CHLORIDE, SODIUM LACTATE AND CALCIUM CHLORIDE: 600; 310; 30; 20 INJECTION, SOLUTION INTRAVENOUS at 08:00

## 2022-04-10 RX ADMIN — SODIUM CHLORIDE, POTASSIUM CHLORIDE, SODIUM LACTATE AND CALCIUM CHLORIDE: 600; 310; 30; 20 INJECTION, SOLUTION INTRAVENOUS at 06:30

## 2022-04-10 RX ADMIN — DOCUSATE SODIUM 100 MG: 100 CAPSULE, LIQUID FILLED ORAL at 20:45

## 2022-04-10 ASSESSMENT — PAIN DESCRIPTION - PROGRESSION: CLINICAL_PROGRESSION: GRADUALLY WORSENING

## 2022-04-10 ASSESSMENT — PAIN SCALES - GENERAL
PAINLEVEL_OUTOF10: 10
PAINLEVEL_OUTOF10: 2

## 2022-04-10 ASSESSMENT — PAIN DESCRIPTION - PAIN TYPE: TYPE: ACUTE PAIN

## 2022-04-10 ASSESSMENT — PAIN DESCRIPTION - ORIENTATION: ORIENTATION: LOWER

## 2022-04-10 ASSESSMENT — PAIN - FUNCTIONAL ASSESSMENT: PAIN_FUNCTIONAL_ASSESSMENT: ACTIVITIES ARE NOT PREVENTED

## 2022-04-10 ASSESSMENT — PAIN DESCRIPTION - LOCATION: LOCATION: ABDOMEN

## 2022-04-10 ASSESSMENT — PAIN DESCRIPTION - FREQUENCY: FREQUENCY: INTERMITTENT

## 2022-04-10 ASSESSMENT — PAIN DESCRIPTION - ONSET: ONSET: ON-GOING

## 2022-04-10 ASSESSMENT — PAIN DESCRIPTION - DESCRIPTORS: DESCRIPTORS: STABBING

## 2022-04-10 NOTE — PROGRESS NOTES
Bedside report from Jenaro [place] RN for safe transfer of care. Pt taken by wheel chair to room 378 with FOB. Patient is alert, oriented and utilizing comfort measures for labor support. Family/support person at bedside. Room equipment checked and prepared for delivery. RN info updated on white board. Will continue to monitor.

## 2022-04-10 NOTE — PLAN OF CARE
Problem: Anxiety:  Goal: Level of anxiety will decrease  Outcome: Ongoing     Problem: Breathing Pattern - Ineffective:  Goal: Able to breathe comfortably  Outcome: Ongoing     Problem: Fluid Volume - Imbalance:  Goal: Absence of imbalanced fluid volume signs and symptoms  Outcome: Ongoing  Goal: Absence of intrapartum hemorrhage signs and symptoms  Outcome: Ongoing     Problem: Infection - Intrapartum Infection:  Goal: Will show no infection signs and symptoms  Outcome: Ongoing     Problem: Labor Process - Prolonged:  Goal: Labor progression, first stage, within specified pattern  Outcome: Ongoing  Goal: Labor progession, second stage, within specified pattern  Outcome: Ongoing  Goal: Uterine contractions within specified parameters  Outcome: Ongoing     Problem:  Screening:  Goal: Ability to make informed decisions regarding treatment has improved  Outcome: Ongoing     Problem: Pain - Acute:  Goal: Pain level will decrease  Outcome: Ongoing  Goal: Able to cope with pain  Outcome: Ongoing     Problem: Tissue Perfusion - Uteroplacental, Altered:  Goal: Absence of abnormal fetal heart rate pattern  Outcome: Ongoing     Problem: Urinary Retention:  Goal: Experiences of bladder distention will decrease  Outcome: Ongoing  Goal: Urinary elimination within specified parameters  Outcome: Ongoing

## 2022-04-10 NOTE — ANESTHESIA POSTPROCEDURE EVALUATION
Department of Anesthesiology  Postprocedure Note    Patient: Estee Freeman  MRN: [id number]  YOB: 2000  Date of evaluation: 4/10/2022  Time:  4:08 PM     Procedure Summary     Date: 04/10/22 Room / Location:     Anesthesia Start: 9312 Anesthesia Stop: 1034    Procedure: Labor Analgesia Diagnosis:     Scheduled Providers:  Responsible Provider: Rafat Luna MD    Anesthesia Type: epidural ASA Status: 2 - Emergent          Anesthesia Type: epidural    James Phase I:      James Phase II:      Last vitals: Reviewed and per EMR flowsheets. Anesthesia Post Evaluation    Patient location during evaluation: bedside  Patient participation: complete - patient cannot participate  Level of consciousness: awake and alert  Pain score: 0  Complications: no  Respiratory status: acceptable  Comments: Patient denies headache, nausea at this time.

## 2022-04-10 NOTE — ANESTHESIA PRE PROCEDURE
Department of Anesthesiology  Preprocedure Note       Name:  Perez Kapoor   Age:  20 y.o.  :  2000                                          MRN:  [id number]         Date:  4/10/2022      Surgeon: * No surgeons listed *    Procedure: * No procedures listed *    Medications prior to admission:   Prior to Admission medications    Medication Sig Start Date End Date Taking?  Authorizing Provider   Prenatal Vit-Fe Fumarate-FA (PRENATAL 1+1 PO) Take 1 tablet by mouth daily    Historical Provider, MD       Current medications:    Current Facility-Administered Medications   Medication Dose Route Frequency Provider Last Rate Last Admin    lactated ringers infusion   IntraVENous Continuous Parvin Gutierrez  mL/hr at 04/10/22 0630 New Bag at 04/10/22 0630    lactated ringers bolus  500 mL IntraVENous PRN Parvin Gutierrez MD        Or    lactated ringers bolus  1,000 mL IntraVENous PRN Parvin Gutierrez MD        sodium chloride flush 0.9 % injection 5-40 mL  5-40 mL IntraVENous 2 times per day Parvin Gutierrez MD        sodium chloride flush 0.9 % injection 5-40 mL  5-40 mL IntraVENous PRN Parvin Gutierrez MD        0.9 % sodium chloride infusion  25 mL IntraVENous PRN Parvin Gutierrez MD        ondansetron [facility]) injection 4 mg  4 mg IntraVENous Q6H PRN Parvin Gutierrez MD        diphenhydrAMINE (BENADRYL) tablet 25 mg  25 mg Oral Q4H PRN Parvin Gutierrez MD        acetaminophen (TYLENOL) tablet 650 mg  650 mg Oral Q4H PRN Parvin Gutierrez MD        bupivacaine (PF) (MARCAINE) 0.5 % injection              Facility-Administered Medications Ordered in Other Encounters   Medication Dose Route Frequency Provider Last Rate Last Admin    bupivacaine (PF) (MARCAINE) 0.25 % injection   IntraSPINal PRN Ary Christy, APRN - CRNA   2 mL at 04/10/22 0713    sodium chloride 0.9 % 200 mL with fentaNYL 500 mcg, bupivacaine 0.5% 50 mL (OB) epidural   Epidural Continuous PRN Ary Bethg, APRN - CRNA 15 mL/hr at 04/10/22 0718 15 mL/hr at 04/10/22 0718       Allergies:  No Known Allergies    Problem List:    Patient Active Problem List   Diagnosis Code    Threatened  labor O47.00    Normal labor O80, Z37.9       Past Medical History:        Diagnosis Date    Asthma     excercise induced    Polyhydramnios        Past Surgical History:        Procedure Laterality Date    TONSILLECTOMY         Social History:    Social History     Tobacco Use    Smoking status: Former Smoker    Smokeless tobacco: Never Used   Substance Use Topics    Alcohol use: Yes     Comment: occ                                Counseling given: Not Answered      Vital Signs (Current):   Vitals:    04/10/22 0615   Weight: 206 lb (93.4 kg)   Height: 5' 7\" (1.702 m)                                              BP Readings from Last 3 Encounters:   22 116/77   04/15/21 112/70   19 122/75       NPO Status:                                                                                 BMI:   Wt Readings from Last 3 Encounters:   04/10/22 206 lb (93.4 kg)   22 196 lb (88.9 kg)   04/15/21 140 lb (63.5 kg)     Body mass index is 32.26 kg/m². CBC:   Lab Results   Component Value Date    WBC 14.0 04/10/2022    RBC 4.25 04/10/2022    HGB 11.2 04/10/2022    HCT 34.7 04/10/2022    MCV 81.5 04/10/2022    RDW 15.7 04/10/2022     04/10/2022       CMP:   Lab Results   Component Value Date     04/15/2021    K 4.5 04/15/2021     04/15/2021    CO2 25 04/15/2021    BUN 9 04/15/2021    CREATININE 0.7 04/15/2021    GFRAA >60 04/15/2021    AGRATIO 1.5 04/15/2021    LABGLOM >60 04/15/2021    GLUCOSE 106 04/15/2021    PROT 7.9 04/15/2021    CALCIUM 10.2 04/15/2021    BILITOT 0.4 04/15/2021    ALKPHOS 37 04/15/2021    AST 18 04/15/2021    ALT 14 04/15/2021       POC Tests: No results for input(s): POCGLU, POCNA, POCK, POCCL, POCBUN, POCHEMO, POCHCT in the last 72 hours.     Coags: No results found for: PROTIME, INR, APTT    HCG (If Applicable): No results found for: PREGTESTUR, PREGSERUM, HCG, HCGQUANT     ABGs: No results found for: PHART, PO2ART, YQR9QRR, BJE5OOO, BEART, D8QUOJBH     Type & Screen (If Applicable):  No results found for: LABABO, LABRH    Drug/Infectious Status (If Applicable):  No results found for: HIV, HEPCAB    COVID-19 Screening (If Applicable): No results found for: COVID19        Anesthesia Evaluation  Patient summary reviewed and Nursing notes reviewed no history of anesthetic complications:   Airway: Mallampati: II  TM distance: >3 FB   Neck ROM: full  Mouth opening: > = 3 FB Dental:          Pulmonary:   (+) asthma:                            Cardiovascular:Negative CV ROS                      Neuro/Psych:   Negative Neuro/Psych ROS              GI/Hepatic/Renal: Neg GI/Hepatic/Renal ROS            Endo/Other: Negative Endo/Other ROS                    Abdominal:             Vascular: negative vascular ROS. Other Findings:             Anesthesia Plan      epidural     ASA 2 - emergent             Anesthetic plan and risks discussed with patient. Plan discussed with attending. Alternatives to, benifits and risks of continuous lumbar epidural for labor (including, but not limited to, hypotension, spinal headache, inadequate sensory blockade) were discussed in detail with the patient. All questions were answered to her satisfaction.   The patient desires and agrees to proceed with continuous epidural.      Rae Foster, APRN - CRNA   4/10/2022

## 2022-04-10 NOTE — ANESTHESIA PROCEDURE NOTES
Epidural Block    Patient location during procedure: OB  Start time: 4/10/2022 7:06 AM  End time: 4/10/2022 7:14 AM  Reason for block: labor epidural  Staffing  Anesthesiologist: Fabio Tamayo MD  Resident/CRNA: KATHARINE Feliciano - CRNA  Preanesthetic Checklist  Completed: patient identified, IV checked, site marked, risks and benefits discussed, monitors and equipment checked, pre-op evaluation, timeout performed, anesthesia consent given, oxygen available and patient being monitored  Epidural  Patient position: sitting  Prep: Betadine  Patient monitoring: cardiac monitor, continuous pulse ox and frequent blood pressure checks  Approach: midline  Location: lumbar (1-5)  Injection technique: JAMES saline  Provider prep: mask and sterile gloves  Needle  Needle type: Tuohy   Needle gauge: 17 G  Needle length: 3.5 in  Catheter type: side hole  Catheter size: 19 G (20 G)  Test dose: negative (3 cc of 1.5 % xylocaine with epi)  Assessment  Sensory level: T8  Hemodynamics: stable  Attempts: 1  Additional Notes  Pt. prepped and draped in sterile fashion. Skin wheal with 1% lidocaine. 17ga touhy needle to JAMES. 25 ga. Spinal needle per touhy. CSF visualized in hub and 2 cc of 0.25 % bupivacaine injected . Needle withdrawn and catheter threaded. Negative test dose. Catheter secured with sterile dressing.

## 2022-04-10 NOTE — H&P
Department of Obstetrics and Gynecology  Attending Obstetrics History and Physical        CHIEF COMPLAINT:  Labor    HISTORY OF PRESENT ILLNESS:      The patient is a 20 y.o. y.o. [id number]  at 38w0d by LMP, confirmed by 8wk ultrasound, [facility] 2022 is admitted for labor. Pregnancy risk factors include polyhydramnios (ESTELA 26), family history of congenital heart defect    PRENATAL CARE:    Provider:  Rafael    Blood Type/Rh:  O POS    Antibody Screen:  Neg  Rubella:  Immune  RPR:  NR  Hepatitis B Surface Antigen: Neg  HIV:  Neg  Gonorrhea:  Neg  Chlamydia:  Neg  1 hour Glucose Tolerance Test:  90  Group B Strep:  negative      REVIEW OF SYSTEMS:    CONSTITUTIONAL:  negative for  fevers and sweats  RESPIRATORY:  negative for  dry cough and dyspnea  CARDIOVASCULAR:  negative for  chest pain, palpitations  GASTROINTESTINAL:  negative for nausea, vomiting and change in bowel habits  GENITOURINARY:  negative for frequency and dysuria  INTEGUMENT/BREAST:  negative for rash  MUSCULOSKELETAL:  negative for  myalgias    PHYSICAL EXAM:  CONSTITUTIONAL:  awake, alert, cooperative, no apparent distress, and appears stated age  ABDOMEN:  Soft non tender  MUSCULOSKELETAL:  Full range of motion  Fetal heart rate: CAT 1, reassuring, 130 bpm, moderate variability, +accels  Cervix: 4cm on admission, progressed to C/C/+2  Contraction frequency:  q 2 minutes  Membranes:  SROM    General Labs:    WBC/Hgb/Hct/Plts:  14.0/11.2/34.7/339 (04/10 0600)     ASSESSMENT AND PLAN:    20 y.o. y.o. [id number]  at 38w0d    Principal Problem:  -In labor  -Reassuring fetal status    Plan:   1. Admit to L&D for delivery  2. Admission labs drawn  3. Anticipate .

## 2022-04-10 NOTE — L&D DELIVERY NOTE
Department of Obstetrics and Gynecology  Spontaneous Vaginal Delivery Note     Anesthesia:  epidural anesthesia    Delivery Summary:  Labor & Delivery Summary  Dilation Complete Date: 04/10/22  Dilation Complete Time: 0940       Condition:  infant stable to general nursery, mother stable    Blood Type and Rh: O POS        Rubella Immunity Status:   Immune           Infant Feeding:    breast     Patient was admitted in labor and progressed spontaneously. SROM occurred shortly before the second stage of labor. I was called to room for delivery and pushed with patient for < 10 minutes. Head delivered OA over intact perineum. Shoulders and body delivered easily and spontaneously crying was placed on mother's abdomen. After approximately 1 minute, the cord was double clamped. The baby's father cut the cord. Cord blood was obtained. Placenta delivered intact with gentle traction on cord. Fundus was massaged and found to be firm. A second degree laceration was repaired with 3-0 Vicryl in usual fashion. Vagina was cleared of remaining blood and clots. There were no foreign bodies. Mother and baby girl tolerated delivery well.  ml.

## 2022-04-11 PROBLEM — O47.00 THREATENED PRETERM LABOR: Status: RESOLVED | Noted: 2022-03-17 | Resolved: 2022-04-11

## 2022-04-11 LAB
HCT VFR BLD CALC: 29.9 % (ref 36–48)
HEMOGLOBIN: 9.7 G/DL (ref 12–16)
MCH RBC QN AUTO: 27.3 PG (ref 26–34)
MCHC RBC AUTO-ENTMCNC: 32.5 G/DL (ref 31–36)
MCV RBC AUTO: 83.8 FL (ref 80–100)
PDW BLD-RTO: 16 % (ref 12.4–15.4)
PLATELET # BLD: 277 K/UL (ref 135–450)
PMV BLD AUTO: 9.4 FL (ref 5–10.5)
RBC # BLD: 3.56 M/UL (ref 4–5.2)
TOTAL SYPHILLIS IGG/IGM: NORMAL
WBC # BLD: 14 K/UL (ref 4–11)

## 2022-04-11 PROCEDURE — 36415 COLL VENOUS BLD VENIPUNCTURE: CPT

## 2022-04-11 PROCEDURE — 6370000000 HC RX 637 (ALT 250 FOR IP): Performed by: OBSTETRICS & GYNECOLOGY

## 2022-04-11 PROCEDURE — 1220000000 HC SEMI PRIVATE OB R&B

## 2022-04-11 PROCEDURE — 2580000003 HC RX 258: Performed by: OBSTETRICS & GYNECOLOGY

## 2022-04-11 PROCEDURE — 6370000000 HC RX 637 (ALT 250 FOR IP)

## 2022-04-11 PROCEDURE — 85027 COMPLETE CBC AUTOMATED: CPT

## 2022-04-11 RX ORDER — IBUPROFEN 800 MG/1
800 TABLET ORAL EVERY 8 HOURS
Qty: 30 TABLET | Refills: 0 | Status: SHIPPED | OUTPATIENT
Start: 2022-04-11

## 2022-04-11 RX ADMIN — DOCUSATE SODIUM 100 MG: 100 CAPSULE, LIQUID FILLED ORAL at 09:06

## 2022-04-11 RX ADMIN — ACETAMINOPHEN 1000 MG: 500 TABLET ORAL at 05:58

## 2022-04-11 RX ADMIN — WITCH HAZEL: 500 SOLUTION RECTAL; TOPICAL at 19:45

## 2022-04-11 RX ADMIN — MOXIFLOXACIN HYDROCHLORIDE 800 MG: 400 TABLET, FILM COATED ORAL at 00:02

## 2022-04-11 RX ADMIN — FAMOTIDINE 20 MG: 20 TABLET ORAL at 19:30

## 2022-04-11 RX ADMIN — MOXIFLOXACIN HYDROCHLORIDE 800 MG: 400 TABLET, FILM COATED ORAL at 21:30

## 2022-04-11 RX ADMIN — DOCUSATE SODIUM 100 MG: 100 CAPSULE, LIQUID FILLED ORAL at 19:30

## 2022-04-11 RX ADMIN — FAMOTIDINE 20 MG: 20 TABLET ORAL at 09:06

## 2022-04-11 RX ADMIN — Medication 10 ML: at 19:33

## 2022-04-11 RX ADMIN — MOXIFLOXACIN HYDROCHLORIDE 800 MG: 400 TABLET, FILM COATED ORAL at 09:06

## 2022-04-11 RX ADMIN — ACETAMINOPHEN 1000 MG: 500 TABLET ORAL at 19:30

## 2022-04-11 ASSESSMENT — PAIN SCALES - GENERAL
PAINLEVEL_OUTOF10: 2
PAINLEVEL_OUTOF10: 4
PAINLEVEL_OUTOF10: 2

## 2022-04-11 NOTE — CARE COORDINATION
Social Work Consult/Assessment    Reason for Consult: positive maternal drug screen  Electronic record reviewed:yes   Delivery information: baby girl Bret Garcia 04/10/2022 38 W 0d Weight 7 lbs 9.3 oz Length:20.5\" Vag-spont Apgar 9,9  Marital Status:Single   Mob's UDS on admission:+ THC   Infant's UDS/Cord tox:+ THC    Spoke with Mob today explained SW services. Present in the room: baby, MOB, FOB  Living situation: baby, MOB, FOB  Address and phone: [address] [place] [address]  Spouse or significant other: AVIVA Neeraj Murphy  ph [phone]  Children: 1st time   Children's Protective Services involvement:  Call placed re + urine   Support system: FOB, family   Domestic Violence: Denies   Mental Health: reports hx anxiety reports took meds in the past, did not like how they made her feel   Post Partum Depression: aware of s/sx   Substance Abuse: MJ use reports took CBD gummy  Off and on for sleep aide, monthly- bi monthly use   Social Assistance Programs: [facility]- connected Food [place]- pending   Medicaid pending for baby   Supplies: has all supplies needed   Every Child Succeeds: declined connected with wic has strong family support. Summary: Writer met with MOB re + urine screen call placed to [place] CPS will follow for cord results, no barrier to dc from . NGA Perez

## 2022-04-11 NOTE — DISCHARGE SUMMARY
Obstetrical Discharge Form    Primary OB Clinician: [facility]    Subjective:  20 y.o. yo [id number] PPD # 1 s/p . Pain is controlled. Lochia is light. Tolerating po, ambulating, voiding without difficulty. Antepartum complications: polyhydramnios    Date of Delivery: 4/10/22    Delivered By: Pam Frye MD     Delivery Type: spontaneous vaginal delivery    Baby: Liveborn female, Apgars 9/9, weight 3439gm, 7 #, 9 oz,     Anesthesia: Epidural    Intrapartum complications: None    Laceration: 2nd degree    Feeding method: breast    Rh Immune globulin given: not applicable    Rubella vaccine given: not applicable    Discharge Date: 2022;    Condition:  stable    OBJECTIVE:      Vitals:  /66   Pulse 85   Temp 98.2 °F (36.8 °C) (Oral)   Resp 18   Ht 5' 7\" (1.702 m)   Wt 206 lb (93.4 kg)   LMP 2021 (Exact Date)   SpO2 99%   Breastfeeding Unknown   BMI 32.26 kg/m²     CONSTITUTIONAL:  awake, alert, cooperative, no apparent distress, and appears stated age  ABDOMEN:  Nontender, fundus firm @ u-1  CHEST/BREASTS:  no increased work of breathing  MUSCULOSKELETAL:  nontender legs, trace edema    DATA:    WBC/Hgb/Hct/Plts:  14.0/11.2/34.7/339 (04/10 0600)     ASSESSMENT:      Active Problems:    Postpartum care following  delivery      Plan:     Follow-up appointment with Dr. Richard Thorpe in 4 weeks.

## 2022-04-12 VITALS
SYSTOLIC BLOOD PRESSURE: 110 MMHG | DIASTOLIC BLOOD PRESSURE: 64 MMHG | HEART RATE: 78 BPM | WEIGHT: 206 LBS | HEIGHT: 67 IN | OXYGEN SATURATION: 99 % | TEMPERATURE: 98.2 F | BODY MASS INDEX: 32.33 KG/M2 | RESPIRATION RATE: 18 BRPM

## 2022-04-12 PROCEDURE — 6370000000 HC RX 637 (ALT 250 FOR IP): Performed by: OBSTETRICS & GYNECOLOGY

## 2022-04-12 RX ADMIN — ACETAMINOPHEN 1000 MG: 500 TABLET ORAL at 09:40

## 2022-04-12 RX ADMIN — ACETAMINOPHEN 1000 MG: 500 TABLET ORAL at 01:58

## 2022-04-12 RX ADMIN — MOXIFLOXACIN HYDROCHLORIDE 800 MG: 400 TABLET, FILM COATED ORAL at 05:52

## 2022-04-12 RX ADMIN — DOCUSATE SODIUM 100 MG: 100 CAPSULE, LIQUID FILLED ORAL at 09:40

## 2022-04-12 RX ADMIN — FAMOTIDINE 20 MG: 20 TABLET ORAL at 09:40

## 2022-04-12 ASSESSMENT — PAIN SCALES - GENERAL
PAINLEVEL_OUTOF10: 3
PAINLEVEL_OUTOF10: 5

## 2022-04-12 NOTE — DISCHARGE SUMMARY
Obstetrical Discharge Form    Primary OB Clinician: [facility]    Subjective:  20 y.o. yo [id number] PPD # 2 s/p . Pain is controlled. Lochia is light. Tolerating po, ambulating, voiding without difficulty. Infant was not discharged yesterday due to bilirubin and social work consult for + urine drug screen [facility])    Antepartum complications: polyhydramnios    Date of Delivery: 4/10/22    Delivered By: Lala Shaffer MD     Delivery Type: spontaneous vaginal delivery    Baby: Liveborn female, Apgars 9/9, weight 3439gm, 7 #, 9 oz,     Anesthesia: Epidural    Intrapartum complications: None    Laceration: 2nd degree    Feeding method: breast    Rh Immune globulin given: not applicable    Rubella vaccine given: not applicable    Discharge Date: 2022;    Condition:  stable    OBJECTIVE:      Vitals:  /77   Pulse 77   Temp 98.4 °F (36.9 °C) (Oral)   Resp 16   Ht 5' 7\" (1.702 m)   Wt 206 lb (93.4 kg)   LMP 2021 (Exact Date)   SpO2 99%   Breastfeeding Unknown   BMI 32.26 kg/m²     CONSTITUTIONAL:  awake, alert, cooperative, no apparent distress, and appears stated age  ABDOMEN:  Nontender, fundus firm @ u-1  CHEST/BREASTS:  no increased work of breathing  MUSCULOSKELETAL:  nontender legs, trace edema    DATA:    WBC/Hgb/Hct/Plts:  14.0/9.7/29.9/277 (729)     ASSESSMENT:      Active Problems:    Postpartum care following  delivery      Plan:     Discharge today- if infant is not able to be discharged today, patient may be eligible for boarding status  Follow-up appointment with Dr. Jenifer Roberts in 4 weeks.

## 2022-04-12 NOTE — FLOWSHEET NOTE
Discharge Phone Call    Patient Name: Talon Keene     [facility] Care Provider: Kerrie Way MD Discharge Date: 2022    Disposition of baby:    Phone Number: [phone] (home)     Attempts to Contact:  Date:    Caller  Date:    Caller  Date:    Caller    Information for the patient's :  Maury Farley [[id number]]   Delivery Method: Vaginal, Spontaneous       1. Now that you are at home is your pain being well controlled? Y/N   If no, instruct to call       provider. 2. Are you breastfeeding? Y/N    Do you need any extra support from our lactation staff? Y/N    If yes, provide number for lactation. 3. Have you made or already had your first appointment with the baby's doctor? Y/N   If no, do      you know when to schedule it? Y/N    4. Have you scheduled your follow-up appointment? Y/N  If no, do you know when to schedule       it? Y/N   If no, they can find it on printed discharge instructions. 5. Did staff discuss safe sleep during your stay? Y/N   6. Did we explain things in a way you could understand? Y/N  7. Were we respectful of your preferences for labor and birth and include you in the plan of       care? Y/N  If no, please explain _______________________________________________  8. Is there anyone in particular you would like to mention who provided care for you? _______      _________________________________________________________________________     9. Were you given a Post-Birth Warning Signs handout? Y/N  Do you have it somewhere      easily accessible? Y/N  If no, please send them a copy and ask them to put it somewhere      easily found. 10. Have you been crying excessively, having anger or mood swings that feel out of control, or       feel like you can't cope with caring for yourself or baby? Y/N   If yes, they may be showing       signs of postpartum depression and should call provider.  There is also a        depression test on page C5 in their discharge booklet they can take. 13. Do you have any other questions or concerns I can address today?  Y/N  ______________      _________________________________________________________________________    Information provided during call :_________________________________________________  ___________________________________________________________________________    Call completed by:____________________________    Date:_________ Time:___________

## 2022-04-13 ENCOUNTER — HOSPITAL ENCOUNTER (OUTPATIENT)
Age: 22
Discharge: HOME | End: 2022-04-13
Attending: OBSTETRICS & GYNECOLOGY | Admitting: OBSTETRICS & GYNECOLOGY
Payer: COMMERCIAL

## 2022-04-13 VITALS — SYSTOLIC BLOOD PRESSURE: 119 MMHG | RESPIRATION RATE: 16 BRPM | DIASTOLIC BLOOD PRESSURE: 81 MMHG | HEART RATE: 89 BPM

## 2022-04-13 PROCEDURE — 99211 OFF/OP EST MAY X REQ PHY/QHP: CPT

## 2022-04-13 NOTE — PROGRESS NOTES
Called Dr. Shanika Webb (inessa URIARTE) to come evaluate patient. SBAR and hx given. States will be right down.

## 2022-04-13 NOTE — PROGRESS NOTES
Dr Shellia Pillion called Dr Aden Elizondo to give assessment and recommendation. When I spoke with Dr. Aden Elizondo, orders received to discharge home, alternate motrin and tylenol ATC, call back if headache worsens despite medication, caffeine in moderation, and hydrate, and follow up next week.

## 2022-04-13 NOTE — H&P
Department of Obstetrics and Gynecology   Obstetrics History and Physical        CHIEF COMPLAINT:  HA worse with activity    HISTORY OF PRESENT ILLNESS:      The patient is a 20 y.o. s/p  3 days ago  OB History        3    Para   1    Term   1            AB   2    Living   1       SAB   2    IAB        Ectopic        Molar        Multiple   0    Live Births   1            Patient presents with a chief complaint as above and is being admitted for observation    Estimated Due Date: Estimated Date of Delivery: 22    PRENATAL CARE:    Complicated by: none    PAST OB HISTORY:  OB History        3    Para   1    Term   1            AB   2    Living   1       SAB   2    IAB        Ectopic        Molar        Multiple   0    Live Births   1            Hilario    Past Medical History:        Diagnosis Date    Asthma     excercise induced    Polyhydramnios      Past Surgical History:        Procedure Laterality Date    TONSILLECTOMY       Allergies:  Patient has no known allergies.     Social History:    Social History     Socioeconomic History    Marital status: Single     Spouse name: Not on file    Number of children: Not on file    Years of education: Not on file    Highest education level: Not on file   Occupational History    Not on file   Tobacco Use    Smoking status: Former Smoker    Smokeless tobacco: Never Used   Vaping Use    Vaping Use: Former   Substance and Sexual Activity    Alcohol use: Yes     Comment: occ    Drug use: Not Currently    Sexual activity: Not on file   Other Topics Concern    Not on file   Social History Narrative    Not on file     Social Determinants of Health     Financial Resource Strain:     Difficulty of Paying Living Expenses: Not on file   Food Insecurity:     Worried About [address] in the Last Year: Not on file    [address] in the Last Year: Not on file   Transportation Needs:     Lack of Transportation (Medical): Not on file    Lack of Transportation (Non-Medical): Not on file   Physical Activity:     Days of Exercise per Week: Not on file    Minutes of Exercise per Session: Not on file   Stress:     Feeling of Stress : Not on file   Social Connections:     Frequency of Communication with Friends and Family: Not on file    Frequency of Social Gatherings with Friends and Family: Not on file    Attends Jain Services: Not on file    Active Member of [address] [place] [address] or Organizations: Not on file    Attends Club or Organization Meetings: Not on file    Marital Status: Not on file   Intimate Partner Violence:     Fear of Current or Ex-Partner: Not on file    Emotionally Abused: Not on file    Physically Abused: Not on file    Sexually Abused: Not on file   Housing Stability:     Unable to Pay for Housing in the Last Year: Not on file    Number of [place] in the Last Year: Not on file    Unstable Housing in the Last Year: Not on file     Family History:   No family history on file. Medications Prior to Admission:  Medications Prior to Admission: ibuprofen (ADVIL;MOTRIN) 800 MG tablet, Take 1 tablet by mouth every 8 hours  Prenatal Vit-Fe Fumarate-FA (PRENATAL 1+1 PO), Take 1 tablet by mouth daily    REVIEW OF SYSTEMS:    wnl    PHYSICAL EXAM:  Vitals:    22 1619 22 1629   BP: 124/82 119/81   Pulse: 89 89   Resp:  16     General appearance:  awake, alert, cooperative, no apparent distress, and appears stated age  Neurologic:  Awake, alert, oriented to name, place and time. Lungs:  No increased work of breathing, good air exchange  Ext:negative  CNS: ambulates well, alert        ASSESSMENT AND PLAN:    Spinal HA s/p epidural/ 3 days ago  No evidence of BP concerns or other CNS issues  Reviewed with Dr. Augustina Rosen    Hydration, caffeine, NSAIDS, rest recommended-if sx persist/worsen next 24-36 hrs RTO    HECTOR Nelson

## 2022-04-13 NOTE — PROGRESS NOTES
Dr. Korina Cuevas called. Notified her pt arrived to triage, BP WNL, and negative for symptoms of preeclampsia. Notified her pt rates headache pain 4-5/10, and has taken ibuprofen 800mg @ 1130 and tylenol @1300. Pt sitting straight up on side of bed with bright lights on in room and has calm demeanor. States to have house officer evaluate and if he feels necessary, have anesthesia evaluate for spinal headache.

## 2022-04-13 NOTE — PROGRESS NOTES
Discharge instructions given in written form and verbally. Denies any questions. Pt ambulated off unit in stable condition, and with significant other.

## 2022-04-15 ENCOUNTER — HOSPITAL ENCOUNTER (OUTPATIENT)
Age: 22
Discharge: HOME | End: 2022-04-15
Attending: OBSTETRICS & GYNECOLOGY | Admitting: OBSTETRICS & GYNECOLOGY
Payer: COMMERCIAL

## 2022-04-15 VITALS — SYSTOLIC BLOOD PRESSURE: 129 MMHG | DIASTOLIC BLOOD PRESSURE: 82 MMHG | RESPIRATION RATE: 16 BRPM | HEART RATE: 104 BPM

## 2022-04-15 PROCEDURE — 99211 OFF/OP EST MAY X REQ PHY/QHP: CPT

## 2022-04-15 RX ORDER — BUTALBITAL, ACETAMINOPHEN AND CAFFEINE 50; 325; 40 MG/1; MG/1; MG/1
1 TABLET ORAL EVERY 4 HOURS PRN
Qty: 20 TABLET | Refills: 0 | Status: SHIPPED | OUTPATIENT
Start: 2022-04-15

## 2022-04-15 RX ORDER — ACETAMINOPHEN 500 MG
500 TABLET ORAL EVERY 6 HOURS PRN
COMMUNITY

## 2022-04-15 NOTE — PROGRESS NOTES
Notified Dr. Patricia Vizcaino [facility] Officer) of triage complaints and assessment. Dr. Dave Hernandez gave orders to d/c pt home, and requested her to be evaluated in person before she leaves.

## 2022-04-15 NOTE — PROGRESS NOTES
PPD#5 (Vaginal delivery with epidural 4/10/22) present to triage with c/o headache that started 3 days ago and has been getting worse. Dr. Perry Jennings sent pt to triage after discussing pt with CRNA on shift. Pt was seen in triage  with same complaints, but came back d/t pain increasing. Pt states pain fluctuates from 4-7/10 on pain scale. Admits that OTC medication tylenol and motrin do help. She states she in eating and hydrating well, consuming caffeine and laying as much as possible. Describes pain as a pulsating pressure that is in the back of head/neck area as well as in her sinuses. Will notify Dr. Perry Jennings of pt arrival and have ROHAN Dhillon CRNA come to evaluate pt for spinal headache.

## 2022-04-15 NOTE — H&P
[facility] and Delivery Triage Note        CHIEF COMPLAINT:  Postpartum headache     HISTORY OF PRESENT ILLNESS:        OB History        3    Para   1    Term   1            AB   2    Living   1       SAB   2    IAB        Ectopic        Molar        Multiple   0    Live Births   1              Pt is 5 days pp from a . Pt of [facility]. Was seen  for HA. Here again for HA and for evaluation for blood patch. R/b reviewed with pt by anesthesia team and decision not to do patch made. Pt without changes in vision/RUQ pain . Estimated Due Date:  Estimated Date of Delivery: 22    PAST MEDICAL HISTORY:   Past Medical History:   Diagnosis Date    Asthma     excercise induced    Polyhydramnios        PAST  SURGICAL HISTORY:   Past Surgical History:   Procedure Laterality Date    TONSILLECTOMY         SOCIAL HISTORY:     reports that she has quit smoking. She has never used smokeless tobacco. She reports current alcohol use. She reports previous drug use. MEDICATIONS:    Prior to Admission medications    Medication Sig Start Date End Date Taking? Authorizing Provider   acetaminophen (TYLENOL) 500 MG tablet Take 500 mg by mouth every 6 hours as needed for Pain   Yes Historical Provider, MD   butalbital-acetaminophen-caffeine (FIORICET, ESGIC) -40 MG per tablet Take 1 tablet by mouth every 4 hours as needed for Headaches 4/15/22  Yes Parvin Gutierrez MD   ibuprofen (ADVIL;MOTRIN) 800 MG tablet Take 1 tablet by mouth every 8 hours 22   Parvin Gutierrez MD   Prenatal Vit-Fe Fumarate-FA (PRENATAL 1+1 PO) Take 1 tablet by mouth daily    Historical Provider, MD          REVIEW OF SYSTEMS:     See HPI     PHYSICAL EXAM:    Vital Signs: Blood pressure 129/82, pulse 104, resp. rate 16, last menstrual period 2021, unknown if currently breastfeeding. GEN: NAD  Neurologic- aware, alert oriented.    Lungs; no increased work of breathing   Ext: neg ASSESSMENT/PLAN:    5 days postpartum with spinal HA   - decision not to do blood patch made   - continue motrin/tylenol/hydration/caffeine.  Script for Fioricet planned   - dc home      Dr. Isiah Jones updated on exam findings and plan      Pool Seth MD  4/15/2022

## 2022-04-15 NOTE — PROGRESS NOTES
Patient discharged home at this time. Patient left unit ambulatory in stable condition with significant other and infant. Discharge instructions reviewed with patient, denies questions and verbalizes understanding.

## 2022-04-15 NOTE — PROGRESS NOTES
Dr. Dave Hernandez notified of pt arrival to triage and that ROHAN Benites has already evaluated pt. Pt does not wish to proceed with blood patch at this time and wants to continue with conservative treatment. VS WNL, negative for pre-eclampsia s/s. Requested Fioricet prescription for pt to trial for pain control. Dr. Dave Hernandez will send Rx for Fioricet to pt preferred pharmacy. Received orders to d/c pt home after house officer sees pt.

## 2022-04-15 NOTE — CONSULTS
Called to [facility] triage to evaluate pt for possible PDPH and possible epidural blood patch. Pt is  s/p vaginal delivery 4/10/22 with epidural analgesia. Pt was originally discharged from delivery on 22 with no documentation or c/o headache. Pt ambulating and daily activities with controlled pain at that time. Notice that pt had +THC in urine drug screen. Pt came to triage 22 for possible PDPH, was evaluated, and went home with instructions to return for further evaluation if pain \"continued or worsened\". Pain rated 4-5/10 at that time. Pt back to triage today with continued c/o pain that pt states has \"worsened\" and is rating 5/10 today. Pt on alternating dose of motrin and tylenol since 22 triage visit which, by report, provides relief from pain. Upon entering triage room, pt found sitting up,lmoderate lights on in room, leaning forward with engaged position/posture, with a calm demeanor, and in no obvious distress. Detailed history reveals that pt is getting up throughout the night for infant feeding, is performing her ADLs, all without problems. Pt states she \"is sleeping better now than when pregnant\". Pt educated about what a PDPH is, classic presentation/symptoms, treatment options, R/B/A to epidural blood patch, and recommendations. After evaluation, it was recommended that since pt pain 5/10, since pt able to perform ADLs and sleep, since pt gets relief from tylenol/motrin regimen, that pt continue at home with fluids and caffeine and no heavy lifting. Pt offered epidural blood patch if desired it today, but pt declined and opted to go home with continued conservative therapy and possible prescription for different meds from [facility] which RN was going to follow up with. Pt encouraged to call [facility] back for any reassurance as needed, and encouraged to return for further evaluation if symptoms continued or worsened.

## 2023-01-22 ENCOUNTER — APPOINTMENT (OUTPATIENT)
Dept: GENERAL RADIOLOGY | Age: 23
End: 2023-01-22
Payer: COMMERCIAL

## 2023-01-22 ENCOUNTER — APPOINTMENT (OUTPATIENT)
Dept: CT IMAGING | Age: 23
End: 2023-01-22
Payer: COMMERCIAL

## 2023-01-22 ENCOUNTER — HOSPITAL ENCOUNTER (EMERGENCY)
Age: 23
Discharge: ANOTHER HOSPITAL | End: 2023-01-23
Attending: EMERGENCY MEDICINE
Payer: COMMERCIAL

## 2023-01-22 VITALS
OXYGEN SATURATION: 100 % | HEIGHT: 67 IN | RESPIRATION RATE: 16 BRPM | DIASTOLIC BLOOD PRESSURE: 76 MMHG | SYSTOLIC BLOOD PRESSURE: 120 MMHG | BODY MASS INDEX: 22.76 KG/M2 | WEIGHT: 145 LBS | HEART RATE: 82 BPM | TEMPERATURE: 98.1 F

## 2023-01-22 DIAGNOSIS — R93.89 ABNORMAL CHEST CT: ICD-10-CM

## 2023-01-22 DIAGNOSIS — R07.81 PLEURITIC CHEST PAIN: Primary | ICD-10-CM

## 2023-01-22 DIAGNOSIS — R91.1 PULMONARY NODULE: ICD-10-CM

## 2023-01-22 LAB
A/G RATIO: 1.3 (ref 1.1–2.2)
ALBUMIN SERPL-MCNC: 4.2 G/DL (ref 3.4–5)
ALP BLD-CCNC: 51 U/L (ref 40–129)
ALT SERPL-CCNC: 29 U/L (ref 10–40)
ANION GAP SERPL CALCULATED.3IONS-SCNC: 10 MMOL/L (ref 3–16)
AST SERPL-CCNC: 25 U/L (ref 15–37)
BASOPHILS ABSOLUTE: 0.1 K/UL (ref 0–0.2)
BASOPHILS RELATIVE PERCENT: 0.7 %
BILIRUB SERPL-MCNC: 0.4 MG/DL (ref 0–1)
BUN BLDV-MCNC: 12 MG/DL (ref 7–20)
CALCIUM SERPL-MCNC: 9.7 MG/DL (ref 8.3–10.6)
CHLORIDE BLD-SCNC: 102 MMOL/L (ref 99–110)
CO2: 27 MMOL/L (ref 21–32)
CREAT SERPL-MCNC: 0.7 MG/DL (ref 0.6–1.1)
D DIMER: 0.45 UG/ML FEU (ref 0–0.6)
EOSINOPHILS ABSOLUTE: 0.1 K/UL (ref 0–0.6)
EOSINOPHILS RELATIVE PERCENT: 1.5 %
GFR SERPL CREATININE-BSD FRML MDRD: >60 ML/MIN/{1.73_M2}
GLUCOSE BLD-MCNC: 94 MG/DL (ref 70–99)
HCG(URINE) PREGNANCY TEST: NEGATIVE
HCT VFR BLD CALC: 40.9 % (ref 36–48)
HEMOGLOBIN: 13.6 G/DL (ref 12–16)
LYMPHOCYTES ABSOLUTE: 2.8 K/UL (ref 1–5.1)
LYMPHOCYTES RELATIVE PERCENT: 37.2 %
MCH RBC QN AUTO: 26.9 PG (ref 26–34)
MCHC RBC AUTO-ENTMCNC: 33.3 G/DL (ref 31–36)
MCV RBC AUTO: 80.8 FL (ref 80–100)
MONOCYTES ABSOLUTE: 0.4 K/UL (ref 0–1.3)
MONOCYTES RELATIVE PERCENT: 5.8 %
NEUTROPHILS ABSOLUTE: 4.1 K/UL (ref 1.7–7.7)
NEUTROPHILS RELATIVE PERCENT: 54.8 %
PDW BLD-RTO: 14.4 % (ref 12.4–15.4)
PLATELET # BLD: 308 K/UL (ref 135–450)
PMV BLD AUTO: 8.5 FL (ref 5–10.5)
POTASSIUM SERPL-SCNC: 4 MMOL/L (ref 3.5–5.1)
RBC # BLD: 5.06 M/UL (ref 4–5.2)
SODIUM BLD-SCNC: 139 MMOL/L (ref 136–145)
TOTAL PROTEIN: 7.4 G/DL (ref 6.4–8.2)
TROPONIN: <0.01 NG/ML
WBC # BLD: 7.6 K/UL (ref 4–11)

## 2023-01-22 PROCEDURE — 85025 COMPLETE CBC W/AUTO DIFF WBC: CPT

## 2023-01-22 PROCEDURE — 80053 COMPREHEN METABOLIC PANEL: CPT

## 2023-01-22 PROCEDURE — 85379 FIBRIN DEGRADATION QUANT: CPT

## 2023-01-22 PROCEDURE — 6370000000 HC RX 637 (ALT 250 FOR IP): Performed by: EMERGENCY MEDICINE

## 2023-01-22 PROCEDURE — 71260 CT THORAX DX C+: CPT | Performed by: EMERGENCY MEDICINE

## 2023-01-22 PROCEDURE — 93005 ELECTROCARDIOGRAM TRACING: CPT | Performed by: EMERGENCY MEDICINE

## 2023-01-22 PROCEDURE — 84703 CHORIONIC GONADOTROPIN ASSAY: CPT

## 2023-01-22 PROCEDURE — 71045 X-RAY EXAM CHEST 1 VIEW: CPT

## 2023-01-22 PROCEDURE — 6360000002 HC RX W HCPCS: Performed by: STUDENT IN AN ORGANIZED HEALTH CARE EDUCATION/TRAINING PROGRAM

## 2023-01-22 PROCEDURE — 36415 COLL VENOUS BLD VENIPUNCTURE: CPT

## 2023-01-22 PROCEDURE — 6360000004 HC RX CONTRAST MEDICATION: Performed by: EMERGENCY MEDICINE

## 2023-01-22 PROCEDURE — 84484 ASSAY OF TROPONIN QUANT: CPT

## 2023-01-22 PROCEDURE — 99285 EMERGENCY DEPT VISIT HI MDM: CPT

## 2023-01-22 RX ORDER — ENOXAPARIN SODIUM 100 MG/ML
1 INJECTION SUBCUTANEOUS ONCE
Status: COMPLETED | OUTPATIENT
Start: 2023-01-22 | End: 2023-01-22

## 2023-01-22 RX ORDER — ACETAMINOPHEN 325 MG/1
650 TABLET ORAL ONCE
Status: COMPLETED | OUTPATIENT
Start: 2023-01-22 | End: 2023-01-22

## 2023-01-22 RX ORDER — NAPROXEN 250 MG/1
500 TABLET ORAL ONCE
Status: COMPLETED | OUTPATIENT
Start: 2023-01-22 | End: 2023-01-22

## 2023-01-22 RX ORDER — LIDOCAINE 4 G/G
1 PATCH TOPICAL ONCE
Status: DISCONTINUED | OUTPATIENT
Start: 2023-01-22 | End: 2023-01-23 | Stop reason: HOSPADM

## 2023-01-22 RX ORDER — KETOROLAC TROMETHAMINE 30 MG/ML
30 INJECTION, SOLUTION INTRAMUSCULAR; INTRAVENOUS ONCE
Status: DISCONTINUED | OUTPATIENT
Start: 2023-01-22 | End: 2023-01-22

## 2023-01-22 RX ADMIN — ACETAMINOPHEN 650 MG: 325 TABLET ORAL at 18:09

## 2023-01-22 RX ADMIN — ENOXAPARIN SODIUM 70 MG: 100 INJECTION SUBCUTANEOUS at 22:21

## 2023-01-22 RX ADMIN — NAPROXEN 500 MG: 250 TABLET ORAL at 18:09

## 2023-01-22 RX ADMIN — IOPAMIDOL 75 ML: 755 INJECTION, SOLUTION INTRAVENOUS at 19:59

## 2023-01-22 ASSESSMENT — PAIN DESCRIPTION - ORIENTATION: ORIENTATION: LEFT

## 2023-01-22 ASSESSMENT — ENCOUNTER SYMPTOMS
CHEST TIGHTNESS: 0
VOMITING: 0
COLOR CHANGE: 0
SHORTNESS OF BREATH: 0
NAUSEA: 1
BACK PAIN: 1
ABDOMINAL PAIN: 0
COUGH: 1

## 2023-01-22 ASSESSMENT — PAIN DESCRIPTION - LOCATION: LOCATION: RIB CAGE

## 2023-01-22 ASSESSMENT — PAIN - FUNCTIONAL ASSESSMENT: PAIN_FUNCTIONAL_ASSESSMENT: 0-10

## 2023-01-22 ASSESSMENT — PAIN SCALES - GENERAL: PAINLEVEL_OUTOF10: 5

## 2023-01-23 ENCOUNTER — HOSPITAL ENCOUNTER (OUTPATIENT)
Age: 23
Setting detail: OBSERVATION
Discharge: HOME | End: 2023-01-24
Attending: INTERNAL MEDICINE | Admitting: INTERNAL MEDICINE
Payer: COMMERCIAL

## 2023-01-23 ENCOUNTER — APPOINTMENT (OUTPATIENT)
Dept: VASCULAR LAB | Age: 23
End: 2023-01-23
Attending: INTERNAL MEDICINE
Payer: COMMERCIAL

## 2023-01-23 ENCOUNTER — APPOINTMENT (OUTPATIENT)
Dept: CT IMAGING | Age: 23
End: 2023-01-23
Attending: INTERNAL MEDICINE
Payer: COMMERCIAL

## 2023-01-23 VITALS
HEART RATE: 74 BPM | WEIGHT: 141.4 LBS | OXYGEN SATURATION: 100 % | RESPIRATION RATE: 18 BRPM | BODY MASS INDEX: 22.19 KG/M2 | TEMPERATURE: 98.6 F | SYSTOLIC BLOOD PRESSURE: 123 MMHG | HEIGHT: 67 IN | DIASTOLIC BLOOD PRESSURE: 79 MMHG

## 2023-01-23 DIAGNOSIS — R07.89 CHEST WALL PAIN: Primary | ICD-10-CM

## 2023-01-23 PROBLEM — I26.99 RIGHT PULMONARY EMBOLUS (HCC): Status: ACTIVE | Noted: 2023-01-23

## 2023-01-23 PROBLEM — R91.1 PULMONARY NODULE: Status: ACTIVE | Noted: 2023-01-23

## 2023-01-23 PROBLEM — J43.2 CENTRILOBULAR EMPHYSEMA (HCC): Status: ACTIVE | Noted: 2023-01-23

## 2023-01-23 PROBLEM — R93.89 ABNORMAL CT OF THE CHEST: Status: ACTIVE | Noted: 2023-01-23

## 2023-01-23 PROBLEM — U07.1 COVID-19: Status: ACTIVE | Noted: 2023-01-23

## 2023-01-23 PROBLEM — I51.7 RIGHT VENTRICULAR ENLARGEMENT: Status: ACTIVE | Noted: 2023-01-23

## 2023-01-23 PROBLEM — Z72.89 ENGAGES IN VAPING: Status: ACTIVE | Noted: 2023-01-23

## 2023-01-23 LAB
ANION GAP SERPL CALCULATED.3IONS-SCNC: 7 MMOL/L (ref 3–16)
BASOPHILS ABSOLUTE: 0.1 K/UL (ref 0–0.2)
BASOPHILS RELATIVE PERCENT: 0.9 %
BUN BLDV-MCNC: 11 MG/DL (ref 7–20)
CALCIUM SERPL-MCNC: 8.8 MG/DL (ref 8.3–10.6)
CHLORIDE BLD-SCNC: 104 MMOL/L (ref 99–110)
CO2: 26 MMOL/L (ref 21–32)
CREAT SERPL-MCNC: 0.9 MG/DL (ref 0.6–1.1)
EKG ATRIAL RATE: 81 BPM
EKG DIAGNOSIS: NORMAL
EKG P AXIS: 64 DEGREES
EKG P-R INTERVAL: 126 MS
EKG Q-T INTERVAL: 376 MS
EKG QRS DURATION: 92 MS
EKG QTC CALCULATION (BAZETT): 436 MS
EKG R AXIS: 91 DEGREES
EKG T AXIS: 59 DEGREES
EKG VENTRICULAR RATE: 81 BPM
EOSINOPHILS ABSOLUTE: 0.2 K/UL (ref 0–0.6)
EOSINOPHILS RELATIVE PERCENT: 2.5 %
GFR SERPL CREATININE-BSD FRML MDRD: >60 ML/MIN/{1.73_M2}
GLUCOSE BLD-MCNC: 91 MG/DL (ref 70–99)
HCT VFR BLD CALC: 38.5 % (ref 36–48)
HEMOGLOBIN: 12.7 G/DL (ref 12–16)
INFLUENZA A: NOT DETECTED
INFLUENZA B: NOT DETECTED
LV EF: 58 %
LVEF MODALITY: NORMAL
LYMPHOCYTES ABSOLUTE: 3.8 K/UL (ref 1–5.1)
LYMPHOCYTES RELATIVE PERCENT: 51.2 %
MCH RBC QN AUTO: 27.6 PG (ref 26–34)
MCHC RBC AUTO-ENTMCNC: 32.9 G/DL (ref 31–36)
MCV RBC AUTO: 83.7 FL (ref 80–100)
MONOCYTES ABSOLUTE: 0.5 K/UL (ref 0–1.3)
MONOCYTES RELATIVE PERCENT: 7.4 %
NEUTROPHILS ABSOLUTE: 2.8 K/UL (ref 1.7–7.7)
NEUTROPHILS RELATIVE PERCENT: 38 %
PDW BLD-RTO: 14.9 % (ref 12.4–15.4)
PLATELET # BLD: 266 K/UL (ref 135–450)
PMV BLD AUTO: 8.5 FL (ref 5–10.5)
POTASSIUM REFLEX MAGNESIUM: 3.6 MMOL/L (ref 3.5–5.1)
RBC # BLD: 4.59 M/UL (ref 4–5.2)
SARS-COV-2 RNA, RT PCR: DETECTED
SODIUM BLD-SCNC: 137 MMOL/L (ref 136–145)
WBC # BLD: 7.4 K/UL (ref 4–11)

## 2023-01-23 PROCEDURE — 93010 ELECTROCARDIOGRAM REPORT: CPT | Performed by: INTERNAL MEDICINE

## 2023-01-23 PROCEDURE — 6370000000 HC RX 637 (ALT 250 FOR IP): Performed by: INTERNAL MEDICINE

## 2023-01-23 PROCEDURE — 99204 OFFICE O/P NEW MOD 45 MIN: CPT | Performed by: INTERNAL MEDICINE

## 2023-01-23 PROCEDURE — 96372 THER/PROPH/DIAG INJ SC/IM: CPT

## 2023-01-23 PROCEDURE — 82103 ALPHA-1-ANTITRYPSIN TOTAL: CPT

## 2023-01-23 PROCEDURE — 87636 SARSCOV2 & INF A&B AMP PRB: CPT

## 2023-01-23 PROCEDURE — G0379 DIRECT REFER HOSPITAL OBSERV: HCPCS

## 2023-01-23 PROCEDURE — 6360000002 HC RX W HCPCS: Performed by: INTERNAL MEDICINE

## 2023-01-23 PROCEDURE — 36415 COLL VENOUS BLD VENIPUNCTURE: CPT

## 2023-01-23 PROCEDURE — G0378 HOSPITAL OBSERVATION PER HR: HCPCS

## 2023-01-23 PROCEDURE — 80048 BASIC METABOLIC PNL TOTAL CA: CPT

## 2023-01-23 PROCEDURE — 6360000004 HC RX CONTRAST MEDICATION: Performed by: INTERNAL MEDICINE

## 2023-01-23 PROCEDURE — 93306 TTE W/DOPPLER COMPLETE: CPT

## 2023-01-23 PROCEDURE — 93970 EXTREMITY STUDY: CPT

## 2023-01-23 PROCEDURE — 2580000003 HC RX 258: Performed by: INTERNAL MEDICINE

## 2023-01-23 PROCEDURE — 71260 CT THORAX DX C+: CPT | Performed by: INTERNAL MEDICINE

## 2023-01-23 PROCEDURE — 85025 COMPLETE CBC W/AUTO DIFF WBC: CPT

## 2023-01-23 PROCEDURE — 99223 1ST HOSP IP/OBS HIGH 75: CPT | Performed by: INTERNAL MEDICINE

## 2023-01-23 RX ORDER — ONDANSETRON 2 MG/ML
4 INJECTION INTRAMUSCULAR; INTRAVENOUS EVERY 4 HOURS PRN
Status: DISCONTINUED | OUTPATIENT
Start: 2023-01-23 | End: 2023-01-24 | Stop reason: HOSPADM

## 2023-01-23 RX ORDER — SODIUM CHLORIDE 9 MG/ML
INJECTION, SOLUTION INTRAVENOUS CONTINUOUS
Status: DISCONTINUED | OUTPATIENT
Start: 2023-01-23 | End: 2023-01-23

## 2023-01-23 RX ORDER — ACETAMINOPHEN 650 MG/1
650 SUPPOSITORY RECTAL EVERY 4 HOURS PRN
Status: DISCONTINUED | OUTPATIENT
Start: 2023-01-23 | End: 2023-01-24 | Stop reason: HOSPADM

## 2023-01-23 RX ORDER — SODIUM CHLORIDE 0.9 % (FLUSH) 0.9 %
10 SYRINGE (ML) INJECTION PRN
Status: DISCONTINUED | OUTPATIENT
Start: 2023-01-23 | End: 2023-01-24 | Stop reason: HOSPADM

## 2023-01-23 RX ORDER — OXYCODONE HYDROCHLORIDE 5 MG/1
5 TABLET ORAL EVERY 4 HOURS PRN
Status: DISCONTINUED | OUTPATIENT
Start: 2023-01-23 | End: 2023-01-24 | Stop reason: HOSPADM

## 2023-01-23 RX ORDER — ENOXAPARIN SODIUM 100 MG/ML
1 INJECTION SUBCUTANEOUS EVERY 12 HOURS
Status: DISCONTINUED | OUTPATIENT
Start: 2023-01-23 | End: 2023-01-24 | Stop reason: HOSPADM

## 2023-01-23 RX ORDER — ACETAMINOPHEN 325 MG/1
650 TABLET ORAL EVERY 4 HOURS PRN
Status: DISCONTINUED | OUTPATIENT
Start: 2023-01-23 | End: 2023-01-24 | Stop reason: HOSPADM

## 2023-01-23 RX ORDER — SODIUM CHLORIDE 0.9 % (FLUSH) 0.9 %
10 SYRINGE (ML) INJECTION EVERY 12 HOURS SCHEDULED
Status: DISCONTINUED | OUTPATIENT
Start: 2023-01-23 | End: 2023-01-24 | Stop reason: HOSPADM

## 2023-01-23 RX ORDER — SODIUM CHLORIDE 9 MG/ML
INJECTION, SOLUTION INTRAVENOUS PRN
Status: DISCONTINUED | OUTPATIENT
Start: 2023-01-23 | End: 2023-01-24 | Stop reason: HOSPADM

## 2023-01-23 RX ORDER — OXYCODONE HYDROCHLORIDE 5 MG/1
5 TABLET ORAL EVERY 6 HOURS PRN
Qty: 8 TABLET | Refills: 0 | Status: SHIPPED | OUTPATIENT
Start: 2023-01-23 | End: 2023-01-26

## 2023-01-23 RX ORDER — POLYETHYLENE GLYCOL 3350 17 G/17G
17 POWDER, FOR SOLUTION ORAL DAILY PRN
Status: DISCONTINUED | OUTPATIENT
Start: 2023-01-23 | End: 2023-01-24 | Stop reason: HOSPADM

## 2023-01-23 RX ADMIN — SODIUM CHLORIDE: 9 INJECTION, SOLUTION INTRAVENOUS at 03:08

## 2023-01-23 RX ADMIN — IOPAMIDOL 75 ML: 755 INJECTION, SOLUTION INTRAVENOUS at 20:19

## 2023-01-23 RX ADMIN — Medication 10 ML: at 22:22

## 2023-01-23 RX ADMIN — SODIUM CHLORIDE: 9 INJECTION, SOLUTION INTRAVENOUS at 16:55

## 2023-01-23 RX ADMIN — ACETAMINOPHEN 650 MG: 325 TABLET ORAL at 08:26

## 2023-01-23 RX ADMIN — ENOXAPARIN SODIUM 60 MG: 100 INJECTION SUBCUTANEOUS at 09:58

## 2023-01-23 ASSESSMENT — PAIN - FUNCTIONAL ASSESSMENT
PAIN_FUNCTIONAL_ASSESSMENT: ACTIVITIES ARE NOT PREVENTED
PAIN_FUNCTIONAL_ASSESSMENT: ACTIVITIES ARE NOT PREVENTED

## 2023-01-23 ASSESSMENT — PAIN DESCRIPTION - FREQUENCY
FREQUENCY: INTERMITTENT
FREQUENCY: INTERMITTENT

## 2023-01-23 ASSESSMENT — PAIN DESCRIPTION - DESCRIPTORS
DESCRIPTORS: DISCOMFORT
DESCRIPTORS: ACHING;DISCOMFORT

## 2023-01-23 ASSESSMENT — PAIN DESCRIPTION - ORIENTATION
ORIENTATION: LEFT
ORIENTATION: LEFT

## 2023-01-23 ASSESSMENT — PAIN DESCRIPTION - PAIN TYPE
TYPE: ACUTE PAIN
TYPE: ACUTE PAIN

## 2023-01-23 ASSESSMENT — PAIN SCALES - GENERAL
PAINLEVEL_OUTOF10: 2
PAINLEVEL_OUTOF10: 4
PAINLEVEL_OUTOF10: 3

## 2023-01-23 ASSESSMENT — PAIN DESCRIPTION - ONSET
ONSET: ON-GOING
ONSET: ON-GOING

## 2023-01-23 ASSESSMENT — LIFESTYLE VARIABLES: HOW OFTEN DO YOU HAVE A DRINK CONTAINING ALCOHOL: 2-4 TIMES A MONTH

## 2023-01-23 ASSESSMENT — PAIN DESCRIPTION - LOCATION
LOCATION: RIB CAGE
LOCATION: RIB CAGE

## 2023-01-23 NOTE — PROGRESS NOTES
4 Eyes Skin Assessment     The patient is being assess for  Admission    I agree that 2 RN's have performed a thorough Head to Toe Skin Assessment on the patient. ALL assessment sites listed below have been assessed. Areas assessed by both nurses:   [x]   Head, Face, and Ears   [x]   Shoulders, Back, and Chest  [x]   Arms, Elbows, and Hands   [x]   Coccyx, Sacrum, and Ischum  [x]   Legs, Feet, and Heels        Does the Patient have Skin Breakdown?   No         Damien Prevention initiated:  No   Wound Care Orders initiated:  No      [facility] nurse consulted for Pressure Injury (Stage 3,4, Unstageable, DTI, NWPT, and Complex wounds):  No      Nurse 1 eSignature: Electronically signed by Sami Anderson RN on 1/23/23 at 3:20 AM EST    Nurse 2 eSignature: Electronically signed by Miguel Nolen RN on 1/23/23 at 3:29 AM EST

## 2023-01-23 NOTE — ED NOTES
Spoke to Dr. Lynda Sandhoff who is also pulmonology and he thinks the patient needs admitted. He is also the hospitalist at [facility] as well so he is the accepting physician to [facility]. Dr. Catarina Lomax spoke to Dr. Lynda Sandhoff and patient is waiting on a bed at [facility].       Evelin Zhang  44/[id number]/11 [phone]

## 2023-01-23 NOTE — ED PROVIDER NOTES
[address] [facility]        Pt Name: Graciela Naranjo  MRN: [id number]  [place] 2000  Date of evaluation: 1/22/2023  Provider: Brenda Christopher PA-C  PCP: Rafael Nunn  Note Started: 8:27 PM EST 1/22/23       I have seen and evaluated this patient with my supervising physician Monae Ramon MD.      [address] [facility]       Chief Complaint   Patient presents with    Rib Injury     Pt states that she and her SO were \"doing things\" 2 weeks ago and states that she thought she broke a rib on her left side. Pt states that she has had issues with her ribs on her left side before. Pt states that she was feeling better until she was in the kitchen today and heard a pop and states that the pain came back. HISTORY OF PRESENT ILLNESS: 1 or more Elements     History From: patient   Limitations to history : None    /74   Pulse 84   Temp 98.1 °F (36.7 °C) (Oral)   Resp 16   Ht 5' 7\" (1.702 m)   Wt 145 lb (65.8 kg)   LMP  (LMP Unknown)   SpO2 100%   Breastfeeding Yes   BMI 22.71 kg/m²     In brief  Graciela Naranjo is a 20 y.o. female who presents to the ER for evaluation of left sided rib/ chest pain. Pain initially started 2 weeks ago and became worse today after coughing. She is 9 months post partum. She has tenderness to palpation of left lower anterolateral ribs and also has pain into her back. No palpable crepitus. Heart RRR. Lungs are clear to auscultation bilaterally. Vitals are stable.          Results for orders placed or performed during the hospital encounter of 01/22/23   Pregnancy, urine   Result Value Ref Range    HCG(Urine) Pregnancy Test Negative Detects HCG level >20 MIU/mL   CBC with Auto Differential   Result Value Ref Range    WBC 7.6 4.0 - 11.0 K/uL    RBC 5.06 4.00 - 5.20 M/uL    Hemoglobin 13.6 12.0 - 16.0 g/dL    Hematocrit 40.9 36.0 - 48.0 %    MCV 80.8 80.0 - 100.0 fL    MCH 26.9 26.0 - 34.0 pg    MCHC 33.3 31.0 - 36.0 g/dL RDW 14.4 12.4 - 15.4 %    Platelets 950 832 - 111 K/uL    MPV 8.5 5.0 - 10.5 fL    Neutrophils % 54.8 %    Lymphocytes % 37.2 %    Monocytes % 5.8 %    Eosinophils % 1.5 %    Basophils % 0.7 %    Neutrophils Absolute 4.1 1.7 - 7.7 K/uL    Lymphocytes Absolute 2.8 1.0 - 5.1 K/uL    Monocytes Absolute 0.4 0.0 - 1.3 K/uL    Eosinophils Absolute 0.1 0.0 - 0.6 K/uL    Basophils Absolute 0.1 0.0 - 0.2 K/uL   Comprehensive Metabolic Panel   Result Value Ref Range    Sodium 139 136 - 145 mmol/L    Potassium 4.0 3.5 - 5.1 mmol/L    Chloride 102 99 - 110 mmol/L    CO2 27 21 - 32 mmol/L    Anion Gap 10 3 - 16    Glucose 94 70 - 99 mg/dL    BUN 12 7 - 20 mg/dL    Creatinine 0.7 0.6 - 1.1 mg/dL    Est, Glom Filt Rate >60 >60    Calcium 9.7 8.3 - 10.6 mg/dL    Total Protein 7.4 6.4 - 8.2 g/dL    Albumin 4.2 3.4 - 5.0 g/dL    Albumin/Globulin Ratio 1.3 1.1 - 2.2    Total Bilirubin 0.4 0.0 - 1.0 mg/dL    Alkaline Phosphatase 51 40 - 129 U/L    ALT 29 10 - 40 U/L    AST 25 15 - 37 U/L   Troponin   Result Value Ref Range    Troponin <0.01 <0.01 ng/mL   EKG 12 Lead   Result Value Ref Range    Ventricular Rate 81 BPM    Atrial Rate 81 BPM    P-R Interval 126 ms    QRS Duration 92 ms    Q-T Interval 376 ms    QTc Calculation (Bazett) 436 ms    P Axis 64 degrees    R Axis 91 degrees    T Axis 59 degrees    Diagnosis       Normal sinus rhythm with sinus arrhythmiaRightward axisIncomplete right bundle branch blockBorderline ECGWhen compared with ECG of 15-APR-2021 14:48,Incomplete right bundle branch block is now PresentT wave inversion now evident in Anterior leads         XR CHEST PORTABLE    Result Date: 1/22/2023  EXAMINATION: ONE XRAY VIEW OF THE CHEST 1/22/2023 6:28 pm COMPARISON: 04/15/2021. HISTORY: ORDERING SYSTEM PROVIDED HISTORY: left lower chest pain TECHNOLOGIST PROVIDED HISTORY: Reason for exam:->left lower chest pain Reason for Exam: lt rib pain FINDINGS: Cardiomediastinal silhouette appears unremarkable.   No parenchymal opacities. No effusion or pneumothorax. No aggressive osseous lesion. No acute cardiopulmonary process. CT CHEST PULMONARY EMBOLISM W CONTRAST    Result Date: 1/22/2023  EXAMINATION: CTA OF THE CHEST 1/22/2023 7:39 pm TECHNIQUE: CTA of the chest was performed after the administration of intravenous contrast.  Multiplanar reformatted images are provided for review. MIP images are provided for review. Automated exposure control, iterative reconstruction, and/or weight based adjustment of the mA/kV was utilized to reduce the radiation dose to as low as reasonably achievable. COMPARISON: 01/22/2023 and 04/15/2021 HISTORY: ORDERING SYSTEM PROVIDED HISTORY: left sided chest pain, rule out PE and PTX TECHNOLOGIST PROVIDED HISTORY: Reason for exam:->left sided chest pain, rule out PE and PTX Decision Support Exception - unselect if not a suspected or confirmed emergency medical condition->Emergency Medical Condition (MA) Reason for Exam: lt sided chest pain FINDINGS: Study is limited as lower portion of the chest was not imaged entirely. Pulmonary Arteries: Small hypoattenuating focus within a left lower lobe pulmonary artery branch (series 4/image 50). Main pulmonary artery is normal in caliber. Mediastinum: No evidence of mediastinal lymphadenopathy. The heart and pericardium demonstrate no acute abnormality. There is no acute abnormality of the thoracic aorta. Lungs/pleura: Numerous punctate cystic foci within the lungs bilaterally most pronounced within the upper lobes. Small 0.4 cm right upper lobe nodule appears unchanged from prior (series 3/image 55 and series 602/image 47). No focal consolidation or pulmonary edema. No evidence of pleural effusion or pneumothorax. Upper Abdomen: Limited images of the upper abdomen are unremarkable. Soft Tissues/Bones: No acute bone or soft tissue abnormality. 1.  Study limited as lower portion of the chest was not included on this study.  2.  Questionable left lower lobe pulmonary artery filling defect which may reflect beam hardening artifact versus small pulmonary embolism. 3.  Numerous cystic foci bilaterally may reflect sequela of infectious/inflammatory lung disease versus mild centrilobular emphysema. 4.  Small stable right upper lobe pulmonary nodule. If there is a history of smoking, malignancy or other risk factor, CT chest follow-up would be recommended in 12 months to document stability. 5.  Additional findings, as above. CTPA resulted showing artifact verses possible pulmonary embolism LLL. Patient has normal respirations. Oxygen saturation is 100% on room air. 8:57 PM EST  Care transferred to night physician Dr Pat Sheffield. Hospitalist will be consulted. Patient is stable.         Sallie Cao PA-C  01/22/23 2055

## 2023-01-23 NOTE — PROGRESS NOTES
Pt AOx4, VSS, shift assessment completed and charted. Pt c/o intermittent, mild pain in rib cage - declined PRN analgesic, will continue to assess. Pt ambulating independently per baseline with socks on. Pt 4Eye skin assessment done, no wounds seen on admission. Pt overall in good mood and appearing. Pt denying further needs, and was in stable condition when this RN left the room. Bed is low, locked, and call light/bedside table within reach. All care per orders, will continue to monitor throughout this shift.  Electronically signed by David Charles RN on 1/23/2023 at 3:19 AM

## 2023-01-23 NOTE — CONSULTS
[facility]  [phone]      Attending Physician: Hira Nick MD  Reason for Consultation/Chief Complaint: Chest pain, PE    Subjective   History of Present Illness:  Trevor Sanchez is a 20 y.o. female with a history of asthma and e-cigarette use who presented with chest pain. 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 Yesterday, she says that she coughed and afterward felt a pop at her left rib and developed severe pain in this area. She initially thought that she may have broken one of her ribs and went to the ED for further evaluation. She denies any recent fevers, shortness of breath, lower extremity edema, prolonged immobilization, or history of blood clots. She says that she has not been coughing much other than her episode yesterday. She is unaware of any recent sick contacts. On arrival to the ED, the patient was afebrile and hemodynamically stable with oxygen saturation 99% on room air. Her initial EKG showed sinus rhythm with sinus arrhythmia, rightward axis, RSR' pattern in lead V1, and non-specific T wave abnormality. Troponin T trended <0.01--><0.01. Chest x-ray was negative for any acute cardiopulmonary process. A CTPA was obtained and showed a questionable left lower lobe pulmonary artery filling defect possibly representing beam hardening artifact or a small pulmonary embolism. D-dimer was within normal limits. There were also numerous cysts in the bilateral lungs, possibly reflecting sequelae of infectious/inflammatory lung disease versus mild centrilobular emphysema. Additionally a stable small right upper lobe pulmonary nodule was noted. She is currently on SC Lovenox. Past Medical History:   has a past medical history of Asthma and Polyhydramnios. Surgical History:   has a past surgical history that includes Tonsillectomy.      Social History:   reports that she has been smoking e-cigarettes. She has never used smokeless tobacco. She reports current alcohol use. She reports that she does not currently use drugs. Family History:  No family history of blood clots. Home Medications:  Were reviewed and are listed in nursing record and/or below  Prior to Admission medications    Medication Sig Start Date End Date Taking? Authorizing Provider   sodium chloride nebulizer 0.9 % NEBU 30 mL with albuterol (5 MG/ML) 0.5% NEBU Inhale 2 puffs into the lungs as needed 10/1/21  Yes Historical Provider, MD   oxyCODONE (ROXICODONE) 5 MG immediate release tablet Take 1 tablet by mouth every 6 hours as needed for Pain for up to 3 days. Max Daily Amount: 20 mg 1/23/23 1/26/23 Yes Darien Correia MD   apixaban starter pack (ELIQUIS) 5 MG TBPK tablet Take 1 tablet by mouth See Admin Instructions 1/23/23  Yes Darien Correia MD   acetaminophen (TYLENOL) 500 MG tablet Take 500 mg by mouth every 6 hours as needed for Pain    Historical Provider, MD   ibuprofen (ADVIL;MOTRIN) 800 MG tablet Take 1 tablet by mouth every 8 hours 4/11/22   Jannet Khanna MD   Prenatal Vit-Fe Fumarate-FA (PRENATAL 1+1 PO) Take 1 tablet by mouth daily  Patient not taking: No sig reported    Historical Provider, MD        CURRENT Medications:  oxyCODONE (ROXICODONE) immediate release tablet 5 mg, Q4H PRN  enoxaparin (LOVENOX) injection 60 mg, Q12H  sodium chloride flush 0.9 % injection 10 mL, 2 times per day  sodium chloride flush 0.9 % injection 10 mL, PRN  0.9 % sodium chloride infusion, PRN  ondansetron (ZOFRAN) injection 4 mg, Q4H PRN  polyethylene glycol (GLYCOLAX) packet 17 g, Daily PRN  acetaminophen (TYLENOL) tablet 650 mg, Q4H PRN   Or  acetaminophen (TYLENOL) suppository 650 mg, Q4H PRN  0.9 % sodium chloride infusion, Continuous  perflutren lipid microspheres (DEFINITY) injection 1.5 mL, ONCE PRN        Allergies:  Patient has no known allergies. Review of Systems:   A 14 point review of symptoms was completed. Pertinent positives were identified in the HPI. All other review of symptoms negative unless otherwise noted below. Objective   PHYSICAL EXAM:    Vitals:    01/23/23 0815   BP: 97/65   Pulse: 71   Resp: 16   Temp: 98.1 °F (36.7 °C)   SpO2: 100%    Weight: 141 lb 6.4 oz (64.1 kg)       General: Adult female in no acute distress. Pleasant and interactive on exam.  HEENT: Normocephalic, atraumatic, non-icteric, hearing intact, nares normal, mucous membranes moist.  Neck: Supple, trachea midline. No adenopathy. No thyromegaly. No JVD. Chest: Left ribs are tender to palpation. Heart: Regular rate and rhythm. Normal S1 and S2. No murmurs, gallops or rubs. Lungs: Normal respiratory effort. Clear to auscultation bilaterally. No wheezes, rales, or rhonchi. Abdomen: Soft, non-tender. Normoactive bowel sounds. No masses or organomegaly. Skin: No rashes, wounds, or lesions. Pulses: Radial artery pulses 2+. Extremities: No clubbing, cyanosis, or edema. Musculoskeletal: Spontaneously moves all four extremities. Psych: Normal mood and affect. Neuro: Alert and oriented to person, place, and time. No focal deficits noted.       Labs   CBC:   Lab Results   Component Value Date/Time    WBC 7.4 01/23/2023 05:11 AM    RBC 4.59 01/23/2023 05:11 AM    HGB 12.7 01/23/2023 05:11 AM    HCT 38.5 01/23/2023 05:11 AM    MCV 83.7 01/23/2023 05:11 AM    RDW 14.9 01/23/2023 05:11 AM     01/23/2023 05:11 AM     CMP:  Lab Results   Component Value Date/Time     01/23/2023 05:11 AM    K 3.6 01/23/2023 05:11 AM     01/23/2023 05:11 AM    CO2 26 01/23/2023 05:11 AM    BUN 11 01/23/2023 05:11 AM    CREATININE 0.9 01/23/2023 05:11 AM    GFRAA >60 04/15/2021 02:53 PM    AGRATIO 1.3 01/22/2023 07:25 PM    LABGLOM >60 01/23/2023 05:11 AM    GLUCOSE 91 01/23/2023 05:11 AM    PROT 7.4 01/22/2023 07:25 PM    CALCIUM 8.8 01/23/2023 05:11 AM    BILITOT 0.4 01/22/2023 07:25 PM    ALKPHOS 51 01/22/2023 07:25 PM    AST 25 01/22/2023 07:25 PM    ALT 29 01/22/2023 07:25 PM     PT/INR:  No results found for: PTINR  HgBA1c:No results found for: LABA1C  Lab Results   Component Value Date    TROPONINI <0.01 01/22/2023         Cardiac Data     Last EKG: Sinus rhythm with sinus arrhythmia, rightward axis, RSR' pattern in V1, non-specific T wave abnormality. Echo:  TTE 1/23/23:  Conclusions:   Summary   Normal left ventricular systolic function with ejection fraction of 55-60%. No regional wall motion abnormalites are seen. Left ventricular diastolic filling pressure is normal.   Right ventricular is enlarged with systolic function mildly reduced . Systolic pulmonic artery pressure (SPAP) is normal estimated at 36 mmHg   (Right atrial pressure of 8 mmHg). Stress Test: N/A    Cath: N/A    Other Studies:   Chest X-ray 1/22/23:  No acute cardiopulmonary process. CTPA 1/22/23:  1. Study limited as lower portion of the chest was not included on this   study. 2.  Questionable left lower lobe pulmonary artery filling defect which may   reflect beam hardening artifact versus small pulmonary embolism. 3.  Numerous cystic foci bilaterally may reflect sequela of   infectious/inflammatory lung disease versus mild centrilobular emphysema. 4.  Small stable right upper lobe pulmonary nodule. If there is a history of   smoking, malignancy or other risk factor, CT chest follow-up would be   recommended in 12 months to document stability. 5.  Additional findings, as above. Assessment and Plan      1. Left-sided chest pain - Reproducible on palpation and may be secondary chest wall pain or costochondritis. CTPA showed questionable left lower pulmonary artery filling defect, possibly representing a small PE versus beam hardening artifact. If PE present, this could be contributing to patient's symptoms as well, but may be less likely given negative d-dimer.   Troponin T negative x2 and lower concern for an acute coronary syndrome at this time. -OK to give tylenol and/or short course of NSAIDs as needed (for latter will need to monitor for bleeding while on anticoagulant)  -Can continue SC Lovenox 1 mg/kg BID until PE definitively excluded as below    2. Possible small PE - CTPA findings as above. Currently hemodynamically stable.    -Agree with plans for bilateral lower extremity venous duplex ultrasound and plans to repeat CTPA later today to either confirm or rule out diagnosis    3. RV dilation - RV noted to be enlarged on TTE, possibly in setting of acute PE although thrombus burden does not appear to be that significant if truly represented by filling defect. SPAP was WNL. RV systolic function was reported as mildly reduced, but on my review appears to be preserved. S' velocity and and TAPSE were WNL. -Will follow-up results of repeat CTPA and pending results and clinical course, can consider outpatient cardiac MRI to further evaluate RV and rule out intracardiac shunt    4. COVID-19 infection  -Continue supportive care per primary team    5. Pulmonary cysts/pulmonary nodule  -Will need to be followed with serial imaging and may benefit from formal pulmonology evaluation      Thank you for allowing us to participate in the care of Wilfredo Kearney. Please call me with any questions [phone]. Cottie Cockayne, DO  [facility]  [phone] [facility]  [phone] [address] [place] [address]  1/23/2023 1:12 PM      I will address the patient's cardiac risk factors and adjusted pharmacologic treatment as needed. In addition, I have reinforced the need for patient directed risk factor modification. All questions and concerns were addressed to the patient/family. Alternatives to my treatment were discussed. The note was completed using EMR. Every effort was made to ensure accuracy; however, inadvertent computerized transcription errors may be present.

## 2023-01-23 NOTE — PROGRESS NOTES
Pt is asking if it is contraindicated to breastfeed/pump milk if she had a CT scan with contrast.    Called the CT scan department for verification and they advised not to breastfeed 24 hours post-CT scan. Pt was amenable and understood the education provided.

## 2023-01-23 NOTE — PROGRESS NOTES
Prescriptions already paid for by phone by family and filled by the Outpatient Pharm[facility]. Already gave it to the pt and put in belonging's bag in the closet.

## 2023-01-23 NOTE — PROGRESS NOTES
PS to FABIAN Marie @ 0151 - \"Hi there, pt is a direct admit from [facility]. ... may I have some orders for her, please? Thank you!\"    Spoke to [employer], MD on phone. New orders in.

## 2023-01-23 NOTE — CONSULTS
INPATIENT PULMONARY CRITICAL CARE CONSULT NOTE      Chief Complaint/Referring Provider:  Patient is being seen at the request of Dr. Arielle Conte for a consultation for Abnormal CT chest      Presenting HPI: Chest wall pain     As per admitted provider-Pt is an otherwise healthy 20y.o. year-old female who presented to the [facility] ER c/o left sided chest wall pain. She reports that the pain began approximately 2 weeks ago when she thought that she may have broken a rib on her left side. She states that she was doing well until she coughed today. When she coughed she heard a pop and the pain returned. She reports moderately severe burning/stabbing pain in her left chest wall. Upon evaluation in the emergency room a CT PE had findings consistent with a, \"Questionable left lower lobe pulmonary artery filling defect which may reflect beam hardening artifact versus small pulmonary embolism. \"  She was not tachycardic, and a D-dimer was not elevated. Associated signs and symptoms do not include typical chest pain, shortness of breath, lightheaded, dizziness, diaphoresis, edema, orthopnea, paroxysmal nocturnal dyspnea, fever or chills. [facility] ER requested that she be transferred to this facility for further evaluation and treatment.   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 but patient states that she was having some left-sided pleuritic chest pain which was more so on deep inspiration and coughing, patient states that transiently there was an improvement in the chest pain but then she had an episode of significant coughing spell after which the chest pain became more prominent and it was excruciating for which reason patient came to the hospital, patient does not have any significant cough expectoration shortness of breath or wheezing otherwise, patient does not have any increasing rhinorrhea nasal congestion sinus congestion postnasal drainage, patient does not have any epistaxis or hemoptysis, patient did not have any fever or chills, patient states that she did not have any abdominal symptoms of concern, no leg pain, no cough or pain, no recent history of travel, patient states that she vapes, patient does have a 5month-old daughter whom she sometimes carry, patient does not have any history of any chest wall trauma, patient does have history of exercise-induced asthma when she was in the high school, patient has not been using any rescue inhaler on regular basis, patient on questioning further states that she did not have any change in the ambient environment, patient is a homemaker, patient does not have any confusion lethargy, no other pertinent review of system of concern       Patient Active Problem List    Diagnosis Date Noted    Right pulmonary embolus ([address] 2023    Chest wall pain 2023    Abnormal CT of the chest 2023    Pulmonary nodule 2023    Engages in vaping 2023    COVID-19 2023    Right ventricular enlargement 2023    Centrilobular emphysema ([address] 2023     (spontaneous vaginal delivery) 2022    Normal labor 04/10/2022       Past Medical History:   Diagnosis Date    Asthma     excercise induced    Polyhydramnios         Past Surgical History:   Procedure Laterality Date    TONSILLECTOMY          No family history on file. Social History     Tobacco Use    Smoking status: Some Days     Types: E-Cigarettes    Smokeless tobacco: Never   Substance Use Topics    Alcohol use: Yes     Comment: occ        No Known Allergies            Physical Exam:  Blood pressure 97/65, pulse 65, temperature 97.6 °F (36.4 °C), temperature source Oral, resp. rate 18, height 5' 7\" (1.702 m), weight 141 lb 6.4 oz (64.1 kg), SpO2 99 %, currently breastfeeding.'   Constitutional:  No acute distress. HENT:  Oropharynx is clear and moist. No thyromegaly. Eyes:  Conjunctivae are normal. Pupils equal, round, and reactive to light.  No scleral icterus. Neck: . No tracheal deviation present. No obvious thyroid mass. Cardiovascular: Normal rate, regular rhythm, normal heart sounds. No right ventricular heave. No lower extremity edema. Pulmonary/Chest: No wheezes. No rales. Chest wall is not dull to percussion. No accessory muscle usage or stridor. Abdominal: Soft. Bowel sounds present. No distension or hernia. No tenderness. Musculoskeletal: No cyanosis. No clubbing. No obvious joint deformity. Chest wall tenderness   Lymphadenopathy: No cervical or supraclavicular adenopathy. Skin: Skin is warm and dry. No rash or nodules on the exposed extremities. Psychiatric: Normal mood and affect. Behavior is normal.  No anxiety. Neurologic: Alert, awake and oriented. PERRL. Speech fluent        Results:  CBC:   Recent Labs     01/22/23 1925 01/23/23  0511   WBC 7.6 7.4   HGB 13.6 12.7   HCT 40.9 38.5   MCV 80.8 83.7    266     BMP:   Recent Labs     01/22/23 1925 01/23/23  0511    137   K 4.0 3.6    104   CO2 27 26   BUN 12 11   CREATININE 0.7 0.9     LIVER PROFILE:   Recent Labs     01/22/23 1925   AST 25   ALT 29   BILITOT 0.4   ALKPHOS 51         Imaging:  I have reviewed radiology images personally. VL Extremity Venous Bilateral         CT CHEST PULMONARY EMBOLISM W CONTRAST    (Results Pending)     XR CHEST PORTABLE    Result Date: 1/22/2023  EXAMINATION: ONE XRAY VIEW OF THE CHEST 1/22/2023 6:28 pm COMPARISON: 04/15/2021. HISTORY: ORDERING SYSTEM PROVIDED HISTORY: left lower chest pain TECHNOLOGIST PROVIDED HISTORY: Reason for exam:->left lower chest pain Reason for Exam: lt rib pain FINDINGS: Cardiomediastinal silhouette appears unremarkable. No parenchymal opacities. No effusion or pneumothorax. No aggressive osseous lesion. No acute cardiopulmonary process.      CT CHEST PULMONARY EMBOLISM W CONTRAST    Result Date: 1/22/2023  EXAMINATION: CTA OF THE CHEST 1/22/2023 7:39 pm TECHNIQUE: CTA of the chest was performed after the administration of intravenous contrast.  Multiplanar reformatted images are provided for review.  MIP images are provided for review. Automated exposure control, iterative reconstruction, and/or weight based adjustment of the mA/kV was utilized to reduce the radiation dose to as low as reasonably achievable. COMPARISON: 01/22/2023 and 04/15/2021 HISTORY: ORDERING SYSTEM PROVIDED HISTORY: left sided chest pain, rule out PE and PTX TECHNOLOGIST PROVIDED HISTORY: Reason for exam:->left sided chest pain, rule out PE and PTX Decision Support Exception - unselect if not a suspected or confirmed emergency medical condition->Emergency Medical Condition (MA) Reason for Exam: lt sided chest pain FINDINGS: Study is limited as lower portion of the chest was not imaged entirely. Pulmonary Arteries: Small hypoattenuating focus within a left lower lobe pulmonary artery branch (series 4/image 50).  Main pulmonary artery is normal in caliber. Mediastinum: No evidence of mediastinal lymphadenopathy.  The heart and pericardium demonstrate no acute abnormality.  There is no acute abnormality of the thoracic aorta. Lungs/pleura: Numerous punctate cystic foci within the lungs bilaterally most pronounced within the upper lobes.  Small 0.4 cm right upper lobe nodule appears unchanged from prior (series 3/image 55 and series 602/image 47).  No focal consolidation or pulmonary edema.  No evidence of pleural effusion or pneumothorax. Upper Abdomen: Limited images of the upper abdomen are unremarkable. Soft Tissues/Bones: No acute bone or soft tissue abnormality.     1.  Study limited as lower portion of the chest was not included on this study. 2.  Questionable left lower lobe pulmonary artery filling defect which may reflect beam hardening artifact versus small pulmonary embolism. 3.  Numerous cystic foci bilaterally may reflect sequela of infectious/inflammatory lung disease versus mild centrilobular emphysema. 4.   Small stable right upper lobe pulmonary nodule. If there is a history of smoking, malignancy or other risk factor, CT chest follow-up would be recommended in 12 months to document stability. 5.  Additional findings, as above. Latest Reference Range & Units 4/10/22 06:00   Amphetamine Screen, Urine Negative <1000ng/mL  Neg   Benzodiazepine Screen, Urine Negative <200 ng/mL  Neg   Buprenorphine Urine Negative <5 ng/ml  Neg   Cocaine Metabolite Screen, Urine Negative <300 ng/mL  Neg   METHADONE SCREEN, URINE, 58662 Negative <300 ng/mL  Neg   Opiate Scrn, Ur Negative <300 ng/mL  Neg   Oxycodone Urine Negative <100 ng/ml  Neg   PCP Screen, Urine Negative <25 ng/mL  Neg   Cannabinoid Scrn, Ur Negative <50 ng/mL  POSITIVE ! Drug Screen Comment:  see below      Latest Reference Range & Units 4/10/22 07:24 1/23/23 08:06   SARS-CoV-2 RNA, RT PCR NOT DETECTED   DETECTED ! SARS-CoV-2, NAAT Not Detected  Not Detected          Echocardiogram:Summary   Normal left ventricular systolic function with ejection fraction of 55-60%. No regional wall motion abnormalites are seen. Left ventricular diastolic filling pressure is normal.   Right ventricular is enlarged with systolic function mildly reduced . Systolic pulmonic artery pressure (SPAP) is normal estimated at 36 mmHg   (Right atrial pressure of 8 mmHg). PFT:None in Epic     Venous doppler-  Tech Comments   Right   No evidence of deep or superficial venous thrombosis seen involving the lower   extremity. Left   No evidence of deep or superficial venous thrombosis seen involving the lower   extremity. Assessment:  Active Problems:    Chest wall pain    Abnormal CT of the chest    Pulmonary nodule    Engages in vaping    COVID-19    Right ventricular enlargement    Centrilobular emphysema (HCC)  Resolved Problems:    * No resolved hospital problems.  *          Plan:   Oxygen supplementation, if required, to keep saturation between 90 and 94% only  Patient was on room air oxygen when seen  Patient has chest wall tenderness and chest wall pain which appears to be atypical in nature  There is a questionable left lower lobe pulmonary artery embolism on imaging-no clinical evidence of any pulmonary infarction-not significant to give the chest pain to the patient at this time  Patient also has right ventricular enlargement on the echocardiogram-which does not correlate clinically with pulmonary embolism given that the patient has a  Patient does have some cystic lesions in the lung which appears to be centrilobular emphysema clinically as well as radiologically rather than any cystic lung disease  Alpha-1 antitrypsin levels being ordered for the patient  Patient also has a incidental finding of COVID-19 testing positive via PCR  Patient does not have any clinical stigmata of any COVID-pneumonia and does not need any steroids  Patient is getting Lovenox on therapeutic doses at this time as per IM-we will have to reevaluate about discontinuation depending on patient's clinical status and the repeat imaging  Local analgesics like lidocaine can be considered along with  Cardiology wants to repeat a CT of the chest tomorrow to decide further course of management and whether patient needs any cardiac MRI/shunt study  Patient was advised against vaping  Consider PUD prophylaxis if deemed appropriate-IM to decide upon that    Case d/w patient and nursing       Electronically signed by:  Gelacio Meadows MD    1/23/2023    5:19 PM.

## 2023-01-23 NOTE — PROGRESS NOTES
Called echo to schedule patient but it directed me to a voicemail. Left a message for them. A/W callback. [FreeTextEntry1] : Florence underwent the repair of her very large mid abdominal incisional hernia with a 5 x 7 inch intraperitoneal mesh and repair of her smaller incarcerated subxiphoid incisional hernia with mesh on June 28, 2021 under local with IV sedation without any problems or complications. Her wound is clean, dry and intact. There is no evidence of erythema, seroma formation or infection. She is tolerating a diet and having normal bowel movements. She denies any significant postoperative pain or discomfort at this time. She has already returned to work earlier this week.\par \par She was counseled and reassured. Florence was discharged from the office with no specific followup necessary, but she knows to avoid any heavy lifting or strenuous activity for the next several weeks. We also discussed the importance of calorie restriction and healthy eating with regard to weight loss, hernia recurrence and her overall health. She was encouraged to continue to wear her abdominal binder for the better part of the next month.

## 2023-01-23 NOTE — CONSULTS
Consult placed    Who:claudio  Date:1/23/2023,  Time:8:52 AM        Electronically signed by Johanny Burgess on 1/23/2023 at 8:52 AM

## 2023-01-23 NOTE — CARE COORDINATION
Chart review completed. Patient a [age] [facility], admitted for R pulmonary embolus . Hospital day # 1. Patient currently admitted on C3. Current medical plans for patient are Echo- completed, pending Doppler/Pulm/ and Cardiology consults. Discharge plans at present are return home. Please advise as needs arise.    NGA Morrow

## 2023-01-23 NOTE — DISCHARGE SUMMARY
Hospital Medicine PROGRESS NOTE - awaiting repeat CTPA per cardiology input. Patient ID: Loni Wang      Patient's PCP: Сергей Kingston    Admit Date: 1/23/2023     Discharge Date:   01/23/23     Admitting Provider: Steve Freeman MD     Discharge Provider: Jayda Jaime MD     Discharge Diagnoses: Active Hospital Problems    Diagnosis     Right pulmonary embolus ([address] [I26.99]      Priority: Medium    Chest wall pain [R07.89]      Priority: Medium    Abnormal CT of the chest [R93.89]      Priority: Medium       The patient was seen and examined on day of discharge and this discharge summary is in conjunction with any daily progress note from day of discharge. Hospital Course:  Healthy 20 Y F presented to [facility] ED with two weeks of L-sided, lateral chest pain. It hurt mainly when she breathed deeply or coughed. She tested positive for COVID. Saturated fine on RA, afebrile. The patient said that she hadn't really been coughing any more than normal lately, though. She vapes and attributes her mild, chronic cough to vaping, the cold weather, her humidifier, etc.  No fevers or chills lately, no malaise or tiredness. In the ED a CTPA was performed which had fairly equivocal results, artifact vs small LLL PE. Her D-dimer was actually negative. She denied dyspnea. No tachycardia. She was started on anticoagulation and admitted to [facility] overnight for further testing. A TTE, venous doppler, another CTPA, pulmonary consult, and cardiology consult are all pending at the time of this note. At this point I am planning to discharge her with a 3 month course of apixaban for provoked PE. I will update this note if anything changes the plan. Risks, benefits, and options discussed in detail with the patient. Addendum: repeat CTPA ended up showing no PE. Apparently patient left AMA last night.   I will call her to tell her this was a false positive and that she doesn't need the apixaban. Electronically signed by Milady Patrick MD on 1/24/2023 at 4:11 PM   Supportive care for COVID-19 pneumonia. Since she cannot really say when her symptoms started, she will need to isolate through 1/28. I do think it's possible that COVID caused her pleuritic chest pains two weeks ago, but I am not confident of this so the isolation starts at the time of positive testing. Physical Exam Performed:     BP 97/65   Pulse 71   Temp 98.1 °F (36.7 °C) (Oral)   Resp 16   Ht 5' 7\" (1.702 m)   Wt 141 lb 6.4 oz (64.1 kg)   LMP  (LMP Unknown)   SpO2 100%   Breastfeeding Yes   BMI 22.15 kg/m²       General appearance:  No apparent distress, appears stated age and cooperative. HEENT:  Normal cephalic, atraumatic without obvious deformity. Pupils equal, round, and reactive to light. Extra ocular muscles intact. Conjunctivae/corneas clear. Neck: Supple, with full range of motion. No jugular venous distention. Trachea midline. Respiratory:  Normal respiratory effort. Clear to auscultation, bilaterally without Rales/Wheezes/Rhonchi. Cardiovascular:  Regular rate and rhythm with normal S1/S2 without murmurs, rubs or gallops. Abdomen: Soft, non-tender, non-distended with normal bowel sounds. Musculoskeletal:  No clubbing, cyanosis or edema bilaterally. Full range of motion without deformity. Skin: Skin color, texture, turgor normal.  No rashes or lesions. Neurologic:  Neurovascularly intact without any focal sensory/motor deficits. Cranial nerves: II-XII intact, grossly non-focal.  Psychiatric:  Alert and oriented, thought content appropriate, normal insight  Capillary Refill: Brisk,< 3 seconds   Peripheral Pulses: +2 palpable, equal bilaterally       Labs:  For convenience and continuity at follow-up the following most recent labs are provided:      CBC:    Lab Results   Component Value Date/Time    WBC 7.4 01/23/2023 05:11 AM    HGB 12.7 01/23/2023 05:11 AM    HCT 38.5 01/23/2023 05:11 AM  01/23/2023 05:11 AM       Renal:    Lab Results   Component Value Date/Time     01/23/2023 05:11 AM    K 3.6 01/23/2023 05:11 AM     01/23/2023 05:11 AM    CO2 26 01/23/2023 05:11 AM    BUN 11 01/23/2023 05:11 AM    CREATININE 0.9 01/23/2023 05:11 AM    CALCIUM 8.8 01/23/2023 05:11 AM         Significant Diagnostic Studies    Radiology:   CT CHEST PULMONARY EMBOLISM W CONTRAST    (Results Pending)   VL Extremity Venous Bilateral    (Results Pending)          Consults:     IP CONSULT TO CARDIOLOGY  IP CONSULT TO PULMONOLOGY    Disposition:  home     Condition at Discharge: Stable    Discharge Instructions/Follow-up:  Follow up with PCP within 1-2 weeks. Isolate through 1/28. Code Status:  Full Code     Activity: activity as tolerated    Diet: reg diet      Discharge Medications:     Current Discharge Medication List             Details   oxyCODONE (ROXICODONE) 5 MG immediate release tablet Take 1 tablet by mouth every 6 hours as needed for Pain for up to 3 days. Max Daily Amount: 20 mg  Qty: 8 tablet, Refills: 0    Comments: Reduce doses taken as pain becomes manageable  Associated Diagnoses: Chest wall pain      apixaban starter pack (ELIQUIS) 5 MG TBPK tablet Take 1 tablet by mouth See Admin Instructions  Qty: 74 tablet, Refills: 2    Comments: PE dosing                Details   sodium chloride nebulizer 0.9 % NEBU 30 mL with albuterol (5 MG/ML) 0.5% NEBU Inhale 2 puffs into the lungs as needed      acetaminophen (TYLENOL) 500 MG tablet Take 500 mg by mouth every 6 hours as needed for Pain      ibuprofen (ADVIL;MOTRIN) 800 MG tablet Take 1 tablet by mouth every 8 hours  Qty: 30 tablet, Refills: 0      Prenatal Vit-Fe Fumarate-FA (PRENATAL 1+1 PO) Take 1 tablet by mouth daily             Time Spent on discharge: 33 mins in the examination, evaluation, counseling and review of medications and discharge plan.       Signed:    Tanner Habermann, MD   1/23/2023      Thank you Cat Berrios for the opportunity to be involved in this patient's care. If you have any questions or concerns, please feel free to contact me at [phone].

## 2023-01-23 NOTE — H&P
Hospital Medicine  History and Physical    PCP: Shaheed Kylie  Patient Name: Kaitlynn Davison    Date of Service: Pt seen/examined on 1/23/23 and placed in observation. CHIEF COMPLAINT:  Pt seen at [facility] ER c/o chest wall pain. She was admitted to this facility with a concern for possible Pulmonary Emboli  HISTORY OF PRESENT ILLNESS: Pt is an otherwise healthy 20y.o. year-old female who presented to the [facility] ER c/o left sided chest wall pain. She reports that the pain began approximately 2 weeks ago when she thought that she may have broken a rib on her left side. She states that she was doing well until she coughed today. When she coughed she heard a pop and the pain returned. She reports moderately severe burning/stabbing pain in her left chest wall. Upon evaluation in the emergency room a CT PE had findings consistent with a, \"Questionable left lower lobe pulmonary artery filling defect which may reflect beam hardening artifact versus small pulmonary embolism. \"  She was not tachycardic, and a D-dimer was not elevated. Associated signs and symptoms do not include typical chest pain, shortness of breath, lightheaded, dizziness, diaphoresis, edema, orthopnea, paroxysmal nocturnal dyspnea, fever or chills. [facility] ER requested that she be transferred to this facility for further evaluation and treatment. Past Medical History:        Diagnosis Date    Asthma     excercise induced    Polyhydramnios        Past Surgical History:        Procedure Laterality Date    TONSILLECTOMY         Allergies:  Patient has no known allergies. Medications Prior to Admission:    Prior to Admission medications    Medication Sig Start Date End Date Taking?  Authorizing Provider   sodium chloride nebulizer 0.9 % NEBU 30 mL with albuterol (5 MG/ML) 0.5% NEBU Inhale 2 puffs into the lungs as needed 10/1/21  Yes Historical Provider, MD   acetaminophen (TYLENOL) 500 MG tablet Take 500 mg by mouth every 6 hours as needed for Pain    Historical Provider, MD   butalbital-acetaminophen-caffeine (FIORICET, ESGIC) -40 MG per tablet Take 1 tablet by mouth every 4 hours as needed for Headaches  Patient not taking: No sig reported 4/15/22   Mone Kinney MD   ibuprofen (ADVIL;MOTRIN) 800 MG tablet Take 1 tablet by mouth every 8 hours 4/11/22   Mone Kinney MD   Prenatal Vit-Fe Fumarate-FA (PRENATAL 1+1 PO) Take 1 tablet by mouth daily  Patient not taking: No sig reported    Historical Provider, MD       Family History:   Family history is negative for accelerated coronary artery disease, diabetes or malignancies. Social History:   TOBACCO:   reports that she has been smoking e-cigarettes. She has never used smokeless tobacco.  ETOH:   reports current alcohol use. OCCUPATION:      REVIEW OF SYSTEMS:  A full review of systems was performed and is negative except for that which appears in the HPI    Physical Exam:    Vitals: /84   Pulse 79   Temp 97.7 °F (36.5 °C) (Oral)   Resp 16   Ht 5' 7\" (1.702 m)   Wt 141 lb 6.4 oz (64.1 kg)   LMP  (LMP Unknown)   SpO2 99%   Breastfeeding Yes   BMI 22.15 kg/m²   General appearance: WD/WN 20y.o. year-old female who is alert, appears stated age and is cooperative  HEENT: Head: Normocephalic, no lesions, without obvious abnormality. Eye: Normal external eye, conjunctiva, lids cornea, PEERL. Ears: Normal external ears. Non-tender. Nose: Normal external nose, mucus membranes and septum. Pharynx: Dental Hygiene adequate. Normal buccal mucosa. Normal pharynx.   Neck: no adenopathy, no carotid bruit, no JVD, supple, symmetrical, trachea midline and thyroid not enlarged, symmetric, no tenderness/mass/nodules  Lungs: clear to auscultation bilaterally and no use of accessory muscles  Heart: regular rate and rhythm, S1, S2 normal, no murmur, click, rub or gallop and normal apical impulse  Abdomen: soft, non-tender; bowel sounds normal; no masses, no organomegaly  Extremities: extremities atraumatic, no cyanosis or edema and Homans sign is negative, no sign of DVT. Capillary Refill: Acceptable < 3 seconds   Peripheral Pulses: +3 easily felt, not easily obliterated with pressures   Skin: Skin color, texture, turgor normal. No rashes or lesions on exposed skin  Neurologic: Neurovascularly intact without any focal sensory/motor deficits. Cranial nerves: II-XII intact, grossly non-focal. Gait was not tested. Mental Status: Alert and oriented, thought content appropriate, normal insight        CBC:   Recent Labs     01/22/23 1925   WBC 7.6   HGB 13.6        BMP:    Recent Labs     01/22/23 1925      K 4.0      CO2 27   BUN 12   CREATININE 0.7   GLUCOSE 94     Troponin:   Recent Labs     01/22/23 1925   TROPONINI <0.01     PT/INR:  No results found for: PTINR  U/A:    Lab Results   Component Value Date/Time    LEUKOCYTESUR Negative 03/16/2022 11:24 PM    RBCUA >100 09/23/2017 04:00 AM    SPECGRAV <=1.005 03/16/2022 11:24 PM    UROBILINOGEN 0.2 03/16/2022 11:24 PM    BILIRUBINUR Negative 03/16/2022 11:24 PM    BLOODU Negative 03/16/2022 11:24 PM    GLUCOSEU Negative 03/16/2022 11:24 PM    PROTEINU Negative 03/16/2022 11:24 PM         RAD:   I have independently reviewed and interpreted the imaging studies below and based my recommendations to the patient on those findings. XR CHEST PORTABLE    Result Date: 1/22/2023  EXAMINATION: ONE XRAY VIEW OF THE CHEST 1/22/2023 6:28 pm COMPARISON: 04/15/2021. HISTORY: ORDERING SYSTEM PROVIDED HISTORY: left lower chest pain TECHNOLOGIST PROVIDED HISTORY: Reason for exam:->left lower chest pain Reason for Exam: lt rib pain FINDINGS: Cardiomediastinal silhouette appears unremarkable. No parenchymal opacities. No effusion or pneumothorax. No aggressive osseous lesion. No acute cardiopulmonary process.      CT CHEST PULMONARY EMBOLISM W CONTRAST    Result Date: 1/22/2023  EXAMINATION: CTA OF THE CHEST 1/22/2023 7:39 pm TECHNIQUE: CTA of the chest was performed after the administration of intravenous contrast.  Multiplanar reformatted images are provided for review. MIP images are provided for review. Automated exposure control, iterative reconstruction, and/or weight based adjustment of the mA/kV was utilized to reduce the radiation dose to as low as reasonably achievable. COMPARISON: 01/22/2023 and 04/15/2021 HISTORY: ORDERING SYSTEM PROVIDED HISTORY: left sided chest pain, rule out PE and PTX TECHNOLOGIST PROVIDED HISTORY: Reason for exam:->left sided chest pain, rule out PE and PTX Decision Support Exception - unselect if not a suspected or confirmed emergency medical condition->Emergency Medical Condition (MA) Reason for Exam: lt sided chest pain FINDINGS: Study is limited as lower portion of the chest was not imaged entirely. Pulmonary Arteries: Small hypoattenuating focus within a left lower lobe pulmonary artery branch (series 4/image 50). Main pulmonary artery is normal in caliber. Mediastinum: No evidence of mediastinal lymphadenopathy. The heart and pericardium demonstrate no acute abnormality. There is no acute abnormality of the thoracic aorta. Lungs/pleura: Numerous punctate cystic foci within the lungs bilaterally most pronounced within the upper lobes. Small 0.4 cm right upper lobe nodule appears unchanged from prior (series 3/image 55 and series 602/image 47). No focal consolidation or pulmonary edema. No evidence of pleural effusion or pneumothorax. Upper Abdomen: Limited images of the upper abdomen are unremarkable. Soft Tissues/Bones: No acute bone or soft tissue abnormality. 1.  Study limited as lower portion of the chest was not included on this study. 2.  Questionable left lower lobe pulmonary artery filling defect which may reflect beam hardening artifact versus small pulmonary embolism.  3.  Numerous cystic foci bilaterally may reflect sequela of infectious/inflammatory lung disease versus mild centrilobular emphysema. 4.  Small stable right upper lobe pulmonary nodule. If there is a history of smoking, malignancy or other risk factor, CT chest follow-up would be recommended in 12 months to document stability. 5.  Additional findings, as above. Assessment:   Principal Problem:    Right pulmonary embolus (HCC)  Active Problems:    Chest wall pain    Abnormal CT of the chest  Resolved Problems:    * No resolved hospital problems. *      Plan:       Right pulmonary embolus ([address] - clinically doubt but will treat with anticoagulation while further testing is completed (therapeutic Lovenox). Arguing against a PE, she is not tachycardic and her D-dimer is not elevated. She has other abnormal findings on her CT which may interfere with the results of a VQ scan was ordered. Therefore, I have ordered another CT PE with contrast for 24 hours after the initial study. We will provide ample hydration leading up to the study. We will check venous Doppler studies of her lower extremity and check a limited echocardiogram for right heart strain. Cardiology has been consulted. Chest wall pain -appears to be musculoskeletal.  We will provide symptomatic treatment. Abnormal CT of the chest - including, \"Numerous cystic foci bilaterally may reflect sequela of infectious/inflammatory lung disease versus mild centrilobular emphysema. 4.  Small stable right upper lobe pulmonary nodule. \" Will consult Pulmonology to evaluate           Code Status: Full Code  Diet: ADULT DIET; Regular  DVT Prophylaxis: Lovenox (therapeutic)    (Advanced care planning has been discussed with patient and/or responsible family member and is reflected in the code status.  Further orders associated with this have been entered if appropriate)      Disposition: Anticipate that patient will remain in the hospital for less than 2 midnights depending on further evaluation and clinical course    Please note that over 50 minutes was spent in evaluating the patient, review of records and results, discussion with staff/family, etc.      Katrina Buchanan MD

## 2023-01-23 NOTE — ED NOTES
Called the access center at 2105 to have the patient transferred to [place]. Dr. Martha Trinidad then spoke with Dr. Tomma Merlin and they agreed the patient could go home. However we need to get her into pulmonology this week and [facility] would not be able to see her this week according to Dr. Tomma Merlin. So the [facility] center just paged out [facility] [place] pulmonology to see if maybe they could.       Daniel Jang  38/30/64 [id number]

## 2023-01-23 NOTE — ED PROVIDER NOTES
Patient was signed out to me by Dr. Jeannine Redd. Currently pending CT PE protocol. CT PE protocol reveals small area of defect concerning for artifact versus pulmonary embolism. I did order a D-dimer which is nonelevated. I discussed with patient as well as hospitalist offered [facility] in discussion with them will admit patient for further evaluation and treatment. Gave patient a dose of Lovenox in the ED. Vitals are overall reassuring and patient is very well-appearing patient will be transferred to [facility] for further evaluation treatment    Impression:  1. Chest wall pain  2.   Pulmonary embolism versus artifact on CT imaging     Alicia Bee MD  01/22/23 1301

## 2023-01-23 NOTE — PROGRESS NOTES
Received call back from [address] [place] [address],[facility] 3 with Cardiology stating that Dr. Saundra Jasmine would like pt to stay for the CT scheduled for tonight. Prescriptions already paid for by phone by family and filled by the Outpatient Pharmacy. They are in pt's belongings bag in closet. Spoke with Dr. Aiden Gomez about Dr. Saundra Jasmine wanting pt to stay for CT tonight, DC order will be cancelled.

## 2023-01-23 NOTE — ED NOTES
Bed#326 and report#[phone].  [address] [facility] [address]  02/[birth year]/30 3776

## 2023-01-23 NOTE — PLAN OF CARE
Problem: Pain  Goal: Verbalizes/displays adequate comfort level or baseline comfort level  1/23/2023 1021 by Sharlene Almaraz RN  Outcome: Progressing  Flowsheets (Taken 1/23/2023 1021)  Verbalizes/displays adequate comfort level or baseline comfort level:   Encourage patient to monitor pain and request assistance   Assess pain using appropriate pain scale   Administer analgesics based on type and severity of pain and evaluate response   Implement non-pharmacological measures as appropriate and evaluate response  1/23/2023 0223 by Jay Tamayo RN  Outcome: Progressing     Problem: ABCDS Injury Assessment  Goal: Absence of physical injury  1/23/2023 1021 by Sharlene Almaraz RN  Outcome: Progressing  1/23/2023 0223 by Jay Tamayo RN  Outcome: Progressing

## 2023-01-24 NOTE — FLOWSHEET NOTE
01/23/23 2015   Assessment   Charting Type Shift assessment   Psychosocial   Psychosocial (WDL) WDL   Neurological   Neuro (WDL) WDL   Level of Consciousness 0   Swallow Screening   Is the patient unable to remain alert for testing? No   Is the patient on a modified diet (thickened liquids) due to pre-existing dysphagia? No   Is there presence of existing enteral tube feeding via the stomach or nose? No   Is there presence of head-of-bed restrictions (less than 30 degrees)? No   Is there presence of tracheotomy tube? No   Is the patient ordered nothing-by-mouth status? No   3 oz Water Swallow Screen Pass   Elayne Coma Scale   Eye Opening 4   Best Verbal Response 5   Best Motor Response 6   Elayne Coma Scale Score 15   HEENT (Head, Ears, Eyes, Nose, & Throat)   HEENT (WDL) WDL   Respiratory   Respiratory (WDL) WDL   Respiratory Interventions Cough & deep breathe   Respiratory Pattern Regular   Respiratory Depth Normal   Respiratory Quality/Effort Unlabored   Chest Assessment Trachea midline; Chest expansion symmetrical   L Breath Sounds Clear   R Breath Sounds Clear   Level of Activity/Mobility 0   Subcutaneous Air/Crepitus None   Breath Sounds   Right Upper Lobe Clear   Right Middle Lobe Clear   Right Lower Lobe Clear   Left Upper Lobe Clear   Left Lower Lobe Clear   Cardiac   Cardiac (WDL) WDL   Gastrointestinal   Abdominal (WDL) WDL   Abdomen Inspection Soft   RUQ Bowel Sounds Active   LUQ Bowel Sounds Active   RLQ Bowel Sounds Active   LLQ Bowel Sounds Active   Genitourinary   Genitourinary (WDL) WDL   Suprapubic Tenderness No   Dysuria (Pain/Burning w/Urination) No   Peripheral Vascular   Peripheral Vascular (WDL) WDL   Edema None   Skin Integumentary    Skin Integumentary (WDL) WDL   Multiple Skin Integrity Sites No   Musculoskeletal   Musculoskeletal (WDL) WDL

## 2023-01-24 NOTE — DISCHARGE SUMMARY
Patient apparently left AMA last night. I have called her multiple times. It goes straight to voicemail. I left her a voicemail asking her to stop taking the apixaban.

## 2023-01-24 NOTE — PROGRESS NOTES
Patient is refusing to stay until morning. On Call attending notified, and advised to allow AMA, instead of discharge order being placed. AMA formed signed by patient. IV removed without complications.

## 2023-01-26 LAB — ALPHA-1 ANTITRYPSIN: 53 MG/DL (ref 90–200)

## 2023-11-25 NOTE — LACTATION NOTE
This note was copied from a baby's chart. Lactation Progress Note      Data:     F/u during lactation rounds with primip breast feeder, 1 pp. MOB delivered by  at 38.0 weeks gestation. Baby is already latched on consult, and nursing well. MOB confirms that the latch is comfortable. Action: Introduced self as [address] on for this evening and offered much support. Reassured of PEGGY observed, and explained how a good latch should look and feel. Encouraged to notice nipple shape when baby releases from the breast, explaining that nipple should be rounded without creasing. Encouraged to be diligent to break the latch if the latch is shallow or causes discomfort. Breast feeding education provided including breast care, expected  feeding behaviors during the first 24-48 hours of life, and how to know infant is getting enough at the breast including daily goals for infant feedings, output, and weight trends. Encouraged much STS, offering the breast exclusively, when infant is first begining to wake and show hunger cues, and every 3 hours if baby is sleepy and without cues. Gave tips to wake sleepy baby as needed. Encouraged much STS and hand expression of colostrum when offering the breast. Instructed that baby should have a minimum of 8-12 good feedings in a 24 hour period after the first DOL. Encouraged exclusive breast feeding unless medical indication were to arise and educated on benefits to wait a few weeks before pumping, or offering bottles, or pacifiers unless medical indication for supplement were to arise. Name and number provided on whiteboard. Encouraged to call for f/u support prn. Response: Verbalized understanding of teaching provided. Comfortable with breast feeding. Will call for f/u support prn.
This note was copied from a baby's chart. Lactation Progress Note      Data:     MOB requests f/u, states baby just came off the breast after a good 30 minute feeding and noticed baby is curling her bottom lip in with latching. Action: Reviewed tips for PEGGY, tips to encourage flared lips, and uncurl bottom lip as needed. Encouraged to call for [facility] to assess latch and f/u as needed. Name and number remains on whiteboard. Response: Verbalized understanding. Will call for f/u support prn.
This note was copied from a baby's chart. Lactation Progress Note      Data:   F/U on primip breast feeder who will be d/c home today. Mob states that baby has been feeding well but feels that the latch gets shallow during feed and nipples are a little sore. Output and wt loss are WNL. Action: Assisted with good position at breast. Encouraged to have baby well supported. Also stressed importance of supporting the breast well. Baby rooting and observed a good deep latch which mob reports as comfortable. Observed SRS and AS. Discharge teaching done; what to expect in the first few days of life, to feed baby at first sign of hunger cue for total of 8-12 times per day after the first DOL, how to properly position and latch baby, how to know baby is getting enough, engorgement prevention and treatment, avoiding bottles and pacifiers, and community resources. Encouraged to call [de-identified] or Outpatient [facility] for f/u prn. Response: Pleased with feed. Verbalized and demonstrated understanding. Comfortable with breast feeding for d/c.
This note was copied from a baby's chart. Lactation Progress Note      Data:   F/U on primip breast feeder. Mob states that baby has been feeding well with a comfortable latch. Baby has had good output. Baby currently giving feeding cues. Action: Assisted with good position at breast. Stressed importance of always providing good breast and baby support to assure a good deep latch. Baby rooting and a good deep latch was achieved with SRS and AS. Breast feeding education reviewed. [facility] number on board for f/u. Response: Verbalized and demonstrated understanding. Comfortable with breast feeding at this time.
This note was copied from a baby's chart. Lactation Progress Note      Data:   Initial lactation consult with primip after  per RN request. Infant currently actively alert latched to right breast in cradle hold. MOB reports infant is in comfortable position with strong tugging noted. LC to provide bf education and support to family. Action: Introduced self to patient as Lactation RN, name and phone number written on white board in room. Observed willow to right breast in cradle hold. Infant with strong coordinated suck present. Reviewed with mother what to expect over the next  24-48 hours with infant feedings, infant output, and breast care. Educated mother on how to hand express colostrum, and encouraged to do so with sleepy feeding attempts q2-3 hours. Reviewed infant feeding cues and encouraged mother to allow infant to breast feed on demand, a minimum of 8-12 times a day after the first day of life. Also encouraged mother to avoid giving infant a pacifier or bottle for at least the first two weeks of life. Binder and breast feeding log reviewed, all questions answered. Mother instructed to call Lactation nurse for F/U care as needed. LC remains at right breast after consult. Response: MOB pleased with infant first feeding after delivery. Verbalizes understanding of bf education that was provided. Will call LC with next feeding attempt as needed.
Unable to assess

## 2024-02-24 NOTE — PLAN OF CARE
Problem: Anxiety:  Goal: Level of anxiety will decrease  Description: Level of anxiety will decrease  Outcome: Completed     Problem: Breathing Pattern - Ineffective:  Goal: Able to breathe comfortably  Description: Able to breathe comfortably  Outcome: Completed     Problem: Fluid Volume - Imbalance:  Goal: Absence of imbalanced fluid volume signs and symptoms  Description: Absence of imbalanced fluid volume signs and symptoms  Outcome: Completed  Goal: Absence of intrapartum hemorrhage signs and symptoms  Description: Absence of intrapartum hemorrhage signs and symptoms  Outcome: Completed 24-Feb-2024 10:50

## 2024-03-06 ENCOUNTER — HOSPITAL ENCOUNTER (EMERGENCY)
Age: 24
Discharge: HOME | End: 2024-03-07
Attending: STUDENT IN AN ORGANIZED HEALTH CARE EDUCATION/TRAINING PROGRAM
Payer: COMMERCIAL

## 2024-03-06 DIAGNOSIS — R10.13 ACUTE EPIGASTRIC PAIN: Primary | ICD-10-CM

## 2024-03-06 LAB
ALBUMIN SERPL-MCNC: 4.3 G/DL (ref 3.4–5)
ALP SERPL-CCNC: 37 U/L (ref 40–129)
ALT SERPL-CCNC: 23 U/L (ref 10–40)
ANION GAP SERPL CALCULATED.3IONS-SCNC: 12 MMOL/L (ref 3–16)
AST SERPL-CCNC: 18 U/L (ref 15–37)
BASOPHILS # BLD: 0 K/UL (ref 0–0.2)
BASOPHILS NFR BLD: 0.3 %
BILIRUB DIRECT SERPL-MCNC: <0.2 MG/DL (ref 0–0.3)
BILIRUB INDIRECT SERPL-MCNC: ABNORMAL MG/DL (ref 0–1)
BILIRUB SERPL-MCNC: 0.4 MG/DL (ref 0–1)
BILIRUB UR QL STRIP.AUTO: NEGATIVE
BUN SERPL-MCNC: 13 MG/DL (ref 7–20)
CALCIUM SERPL-MCNC: 9.9 MG/DL (ref 8.3–10.6)
CHLORIDE SERPL-SCNC: 104 MMOL/L (ref 99–110)
CLARITY UR: CLEAR
CO2 SERPL-SCNC: 23 MMOL/L (ref 21–32)
COLOR UR: YELLOW
CREAT SERPL-MCNC: 0.9 MG/DL (ref 0.6–1.1)
DEPRECATED RDW RBC AUTO: 12.9 % (ref 12.4–15.4)
EOSINOPHIL # BLD: 0 K/UL (ref 0–0.6)
EOSINOPHIL NFR BLD: 0.3 %
GFR SERPLBLD CREATININE-BSD FMLA CKD-EPI: >60 ML/MIN/{1.73_M2}
GLUCOSE SERPL-MCNC: 117 MG/DL (ref 70–99)
GLUCOSE UR STRIP.AUTO-MCNC: NEGATIVE MG/DL
HCG SERPL QL: NEGATIVE
HCT VFR BLD AUTO: 41.3 % (ref 36–48)
HGB BLD-MCNC: 14.1 G/DL (ref 12–16)
HGB UR QL STRIP.AUTO: NEGATIVE
KETONES UR STRIP.AUTO-MCNC: NEGATIVE MG/DL
LEUKOCYTE ESTERASE UR QL STRIP.AUTO: NEGATIVE
LIPASE SERPL-CCNC: 31 U/L (ref 13–60)
LYMPHOCYTES # BLD: 1.7 K/UL (ref 1–5.1)
LYMPHOCYTES NFR BLD: 18.8 %
MCH RBC QN AUTO: 28.9 PG (ref 26–34)
MCHC RBC AUTO-ENTMCNC: 34.2 G/DL (ref 31–36)
MCV RBC AUTO: 84.7 FL (ref 80–100)
MONOCYTES # BLD: 0.3 K/UL (ref 0–1.3)
MONOCYTES NFR BLD: 3.5 %
NEUTROPHILS # BLD: 7.1 K/UL (ref 1.7–7.7)
NEUTROPHILS NFR BLD: 77.1 %
NITRITE UR QL STRIP.AUTO: NEGATIVE
PH UR STRIP.AUTO: 6 [PH] (ref 5–8)
PLATELET # BLD AUTO: 279 K/UL (ref 135–450)
PMV BLD AUTO: 8.8 FL (ref 5–10.5)
POTASSIUM SERPL-SCNC: 3.9 MMOL/L (ref 3.5–5.1)
PROT SERPL-MCNC: 7.7 G/DL (ref 6.4–8.2)
PROT UR STRIP.AUTO-MCNC: NEGATIVE MG/DL
RBC # BLD AUTO: 4.88 M/UL (ref 4–5.2)
SODIUM SERPL-SCNC: 139 MMOL/L (ref 136–145)
SP GR UR STRIP.AUTO: 1.02 (ref 1–1.03)
UA COMPLETE W REFLEX CULTURE PNL UR: NORMAL
UA DIPSTICK W REFLEX MICRO PNL UR: NORMAL
URN SPEC COLLECT METH UR: NORMAL
UROBILINOGEN UR STRIP-ACNC: 0.2 E.U./DL
WBC # BLD AUTO: 9.3 K/UL (ref 4–11)

## 2024-03-06 PROCEDURE — 6370000000 HC RX 637 (ALT 250 FOR IP): Performed by: STUDENT IN AN ORGANIZED HEALTH CARE EDUCATION/TRAINING PROGRAM

## 2024-03-06 PROCEDURE — 81003 URINALYSIS AUTO W/O SCOPE: CPT

## 2024-03-06 PROCEDURE — 6360000002 HC RX W HCPCS: Performed by: STUDENT IN AN ORGANIZED HEALTH CARE EDUCATION/TRAINING PROGRAM

## 2024-03-06 PROCEDURE — 96374 THER/PROPH/DIAG INJ IV PUSH: CPT

## 2024-03-06 PROCEDURE — 83690 ASSAY OF LIPASE: CPT

## 2024-03-06 PROCEDURE — 36415 COLL VENOUS BLD VENIPUNCTURE: CPT

## 2024-03-06 PROCEDURE — 84703 CHORIONIC GONADOTROPIN ASSAY: CPT

## 2024-03-06 PROCEDURE — 99284 EMERGENCY DEPT VISIT MOD MDM: CPT

## 2024-03-06 PROCEDURE — 80076 HEPATIC FUNCTION PANEL: CPT

## 2024-03-06 PROCEDURE — 80048 BASIC METABOLIC PNL TOTAL CA: CPT

## 2024-03-06 PROCEDURE — 2580000003 HC RX 258: Performed by: STUDENT IN AN ORGANIZED HEALTH CARE EDUCATION/TRAINING PROGRAM

## 2024-03-06 PROCEDURE — 85025 COMPLETE CBC W/AUTO DIFF WBC: CPT

## 2024-03-06 PROCEDURE — 96361 HYDRATE IV INFUSION ADD-ON: CPT

## 2024-03-06 RX ORDER — DROPERIDOL 2.5 MG/ML
1.25 INJECTION, SOLUTION INTRAMUSCULAR; INTRAVENOUS ONCE
Status: COMPLETED | OUTPATIENT
Start: 2024-03-06 | End: 2024-03-06

## 2024-03-06 RX ORDER — ETONOGESTREL/ETHINYL ESTRADIOL .12-.015MG
1 RING, VAGINAL VAGINAL
COMMUNITY
Start: 2023-05-16

## 2024-03-06 RX ORDER — MAGNESIUM HYDROXIDE/ALUMINUM HYDROXICE/SIMETHICONE 120; 1200; 1200 MG/30ML; MG/30ML; MG/30ML
30 SUSPENSION ORAL ONCE
Status: COMPLETED | OUTPATIENT
Start: 2024-03-06 | End: 2024-03-06

## 2024-03-06 RX ORDER — SODIUM CHLORIDE, SODIUM LACTATE, POTASSIUM CHLORIDE, AND CALCIUM CHLORIDE .6; .31; .03; .02 G/100ML; G/100ML; G/100ML; G/100ML
1000 INJECTION, SOLUTION INTRAVENOUS ONCE
Status: COMPLETED | OUTPATIENT
Start: 2024-03-06 | End: 2024-03-06

## 2024-03-06 RX ORDER — ONDANSETRON 4 MG/1
4 TABLET, ORALLY DISINTEGRATING ORAL 3 TIMES DAILY PRN
Qty: 21 TABLET | Refills: 0 | Status: SHIPPED | OUTPATIENT
Start: 2024-03-06

## 2024-03-06 RX ORDER — FAMOTIDINE 20 MG/1
20 TABLET, FILM COATED ORAL 2 TIMES DAILY
Qty: 60 TABLET | Refills: 3 | Status: SHIPPED | OUTPATIENT
Start: 2024-03-06

## 2024-03-06 RX ADMIN — SODIUM CHLORIDE, POTASSIUM CHLORIDE, SODIUM LACTATE AND CALCIUM CHLORIDE 1000 ML: 600; 310; 30; 20 INJECTION, SOLUTION INTRAVENOUS at 23:23

## 2024-03-06 RX ADMIN — DROPERIDOL 1.25 MG: 2.5 INJECTION, SOLUTION INTRAMUSCULAR; INTRAVENOUS at 23:23

## 2024-03-06 RX ADMIN — ALUMINUM HYDROXIDE, MAGNESIUM HYDROXIDE, AND SIMETHICONE 30 ML: 1200; 120; 1200 SUSPENSION ORAL at 23:22

## 2024-03-06 ASSESSMENT — PAIN - FUNCTIONAL ASSESSMENT: PAIN_FUNCTIONAL_ASSESSMENT: 0-10

## 2024-03-06 ASSESSMENT — LIFESTYLE VARIABLES
HOW OFTEN DO YOU HAVE A DRINK CONTAINING ALCOHOL: NEVER
HOW MANY STANDARD DRINKS CONTAINING ALCOHOL DO YOU HAVE ON A TYPICAL DAY: PATIENT DOES NOT DRINK

## 2024-03-06 ASSESSMENT — PAIN SCALES - GENERAL: PAINLEVEL_OUTOF10: 6

## 2024-03-07 VITALS
TEMPERATURE: 98.4 F | DIASTOLIC BLOOD PRESSURE: 85 MMHG | SYSTOLIC BLOOD PRESSURE: 134 MMHG | BODY MASS INDEX: 20.8 KG/M2 | RESPIRATION RATE: 18 BRPM | OXYGEN SATURATION: 100 % | HEART RATE: 84 BPM | WEIGHT: 132.5 LBS | HEIGHT: 67 IN

## 2024-03-07 NOTE — ED PROVIDER NOTES
instructions and return precautions, pt voices understanding.  D/c home, ambulated steadily from the ED.        Disposition Considerations (tests considered but not done, Shared Decision Making, Pt Expectation of Test or Tx.):     I estimate there is LOW risk for (including but not limited to) ACUTE APPENDICITIS, BOWEL OBSTRUCTION, ACUTE CHOLECYSTITIS, RUPTURED DIVERTICULITIS, INCARCERATED or STRANGULATED HERNIA, HEMMORHAGIC PANCREATITIS, PERFORATED BOWEL/ULCER, ECTOPIC PREGNANCY, OVARIAN TORSION or TUBO-OVARIAN ABSCESS thus I consider the discharge disposition reasonable. Tamie Barboza (or their surrogate) and I have discussed the diagnosis and risks, and we agree with discharging home with close follow-up. We also discussed returning to the Emergency Department immediately if new or worsening symptoms occur. We have discussed the symptoms which are most concerning that necessitate immediate return.    Appropriate for outpatient management        I am the Primary Clinician of Record.    FINAL IMPRESSION      1. Acute epigastric pain          DISPOSITION/PLAN     DISPOSITION Decision To Discharge 03/06/2024 11:42:01 PM      PATIENT REFERRED TO:  Romy Díaz  [address] [place] [id number]  [phone]    Schedule an appointment as soon as possible for a visit       [facility] Emergency Department  [address] [place] [id number]  [phone]    If symptoms worsen      DISCHARGE MEDICATIONS:  Discharge Medication List as of 3/6/2024 11:48 PM        START taking these medications    Details   ondansetron (ZOFRAN-ODT) 4 MG disintegrating tablet Take 1 tablet by mouth 3 times daily as needed for Nausea or Vomiting, Disp-21 tablet, R-0Normal      famotidine (PEPCID) 20 MG tablet Take 1 tablet by mouth 2 times daily, Disp-60 tablet, R-3Normal               (Please note that portions of this note were completed with a voice recognition program.  Efforts were made to edit the dictations but 
No

## 2024-06-02 LAB — PAP SMEAR, EXTERNAL: NEGATIVE

## 2024-10-26 ENCOUNTER — OFFICE VISIT (OUTPATIENT)
Age: 24
End: 2024-10-26

## 2024-10-26 VITALS
HEART RATE: 105 BPM | TEMPERATURE: 98.6 F | DIASTOLIC BLOOD PRESSURE: 86 MMHG | SYSTOLIC BLOOD PRESSURE: 126 MMHG | OXYGEN SATURATION: 99 %

## 2024-10-26 DIAGNOSIS — F41.9 ANXIETY: Primary | ICD-10-CM

## 2024-10-26 DIAGNOSIS — R42 LIGHTHEADEDNESS: ICD-10-CM

## 2024-10-26 LAB
CHP ED QC CHECK: NORMAL
CONTROL: NORMAL
GLUCOSE BLD-MCNC: 112 MG/DL
PREGNANCY TEST URINE, POC: NORMAL

## 2024-10-26 RX ORDER — HYDROXYZINE HYDROCHLORIDE 25 MG/1
25 TABLET, FILM COATED ORAL 3 TIMES DAILY PRN
COMMUNITY

## 2024-10-26 RX ORDER — BUSPIRONE HYDROCHLORIDE 15 MG/1
7.5 TABLET ORAL 3 TIMES DAILY
COMMUNITY

## 2024-10-26 ASSESSMENT — ENCOUNTER SYMPTOMS
TROUBLE SWALLOWING: 0
NAUSEA: 0
VOMITING: 0
SHORTNESS OF BREATH: 0
COUGH: 0
ABDOMINAL PAIN: 0

## 2024-10-26 NOTE — PATIENT INSTRUCTIONS
Take your hydralazine three times daily for the next few days.   Rest and Increase fluids.    Follow up with your PCP this week.  If any worsening of symptoms go to ER for further workup and assessment.     The patient tolerated their visit well. The patient and/or the family were informed of the results of any tests, a time was given to answer questions, a plan was proposed and they agreed with plan. Reviewed AVS with treatment instructions and answered questions - pt/family expresses understanding and agreement with the discussed treatment plan and AVS instructions.

## 2024-10-26 NOTE — PROGRESS NOTES
Tamie Barboza (:  2000) is a 24 y.o. female,New patient, here for evaluation of the following chief complaint(s):  Lightheadedness (Shaky and anxious , since Wednesday , took hydratazine to fall asleep )      ASSESSMENT/PLAN:    ICD-10-CM    1. Anxiety  F41.9       2. Lightheadedness  R42 POCT Glucose     POCT urine pregnancy          Discussed need for PCP follow up, may need dosage adjustment and/or medication change. Told to try taking vistaril 3 times daily the next few days and see if this helps to improve symptoms. ER if worsening.     Take your hydralazine three times daily for the next few days.   Rest and Increase fluids.    Follow up with your PCP this week.  If any worsening of symptoms go to ER for further workup and assessment.     The patient tolerated their visit well. The patient and/or the family were informed of the results of any tests, a time was given to answer questions, a plan was proposed and they agreed with plan. Reviewed AVS with treatment instructions and answered questions - pt/family expresses understanding and agreement with the discussed treatment plan and AVS instructions.       SUBJECTIVE/OBJECTIVE:    History provided by:  Patient   used: No      HPI:   24 y.o. female presents with symptoms of anxiety since Wednesday. Denies LEBRON MOSLEY. Is currently on Buspar. Sees her therapist this Tuesday. Has taken one night of vistaril for this with some relief. Had to leave work early today. Is wanting a urine pregnancy test. Has anxiety attacks sometimes she states. Had blood work done in Sept at PCP and everything was normal. Wants to make sure vitals and everything is \"okay.\"    Vitals:    10/26/24 1243   BP: 126/86   Pulse: (!) 105   Temp: 98.6 °F (37 °C)   TempSrc: Temporal   SpO2: 99%       Review of Systems   Constitutional:  Negative for fatigue and fever.   HENT:  Negative for congestion and trouble swallowing.    Respiratory:  Negative for cough and  hpi

## 2024-11-10 ENCOUNTER — HOSPITAL ENCOUNTER (EMERGENCY)
Age: 24
Discharge: HOME | End: 2024-11-10
Payer: COMMERCIAL

## 2024-11-10 ENCOUNTER — APPOINTMENT (OUTPATIENT)
Dept: CT IMAGING | Age: 24
End: 2024-11-10
Payer: COMMERCIAL

## 2024-11-10 VITALS
SYSTOLIC BLOOD PRESSURE: 118 MMHG | OXYGEN SATURATION: 99 % | HEART RATE: 80 BPM | HEIGHT: 67 IN | DIASTOLIC BLOOD PRESSURE: 72 MMHG | TEMPERATURE: 98.7 F | RESPIRATION RATE: 16 BRPM | WEIGHT: 143.38 LBS | BODY MASS INDEX: 22.51 KG/M2

## 2024-11-10 DIAGNOSIS — R06.02 SHORTNESS OF BREATH: ICD-10-CM

## 2024-11-10 DIAGNOSIS — R93.89 ABNORMAL CT OF THE CHEST: Primary | ICD-10-CM

## 2024-11-10 LAB
ALBUMIN SERPL-MCNC: 4.5 G/DL (ref 3.4–5)
ALBUMIN/GLOB SERPL: 1.5 {RATIO} (ref 1.1–2.2)
ALP SERPL-CCNC: 29 U/L (ref 40–129)
ALT SERPL-CCNC: 31 U/L (ref 10–40)
ANION GAP SERPL CALCULATED.3IONS-SCNC: 13 MMOL/L (ref 3–16)
AST SERPL-CCNC: 23 U/L (ref 15–37)
BASOPHILS # BLD: 0 K/UL (ref 0–0.2)
BASOPHILS NFR BLD: 0.4 %
BILIRUB SERPL-MCNC: 0.6 MG/DL (ref 0–1)
BUN SERPL-MCNC: 12 MG/DL (ref 7–20)
CALCIUM SERPL-MCNC: 9.4 MG/DL (ref 8.3–10.6)
CHLORIDE SERPL-SCNC: 102 MMOL/L (ref 99–110)
CO2 SERPL-SCNC: 25 MMOL/L (ref 21–32)
CREAT SERPL-MCNC: 0.8 MG/DL (ref 0.6–1.1)
D-DIMER QUANTITATIVE: 0.28 UG/ML FEU (ref 0–0.6)
DEPRECATED RDW RBC AUTO: 13.3 % (ref 12.4–15.4)
EOSINOPHIL # BLD: 0.1 K/UL (ref 0–0.6)
EOSINOPHIL NFR BLD: 0.9 %
GFR SERPLBLD CREATININE-BSD FMLA CKD-EPI: >90 ML/MIN/{1.73_M2}
GLUCOSE SERPL-MCNC: 92 MG/DL (ref 70–99)
HCG SERPL QL: NEGATIVE
HCT VFR BLD AUTO: 44.5 % (ref 36–48)
HGB BLD-MCNC: 14.7 G/DL (ref 12–16)
LYMPHOCYTES # BLD: 2.4 K/UL (ref 1–5.1)
LYMPHOCYTES NFR BLD: 32.1 %
MCH RBC QN AUTO: 29.1 PG (ref 26–34)
MCHC RBC AUTO-ENTMCNC: 33 G/DL (ref 31–36)
MCV RBC AUTO: 88.3 FL (ref 80–100)
MONOCYTES # BLD: 0.4 K/UL (ref 0–1.3)
MONOCYTES NFR BLD: 5.2 %
NEUTROPHILS # BLD: 4.5 K/UL (ref 1.7–7.7)
NEUTROPHILS NFR BLD: 61.4 %
PLATELET # BLD AUTO: 274 K/UL (ref 135–450)
PMV BLD AUTO: 9.1 FL (ref 5–10.5)
POTASSIUM SERPL-SCNC: 3.6 MMOL/L (ref 3.5–5.1)
PROT SERPL-MCNC: 7.5 G/DL (ref 6.4–8.2)
RBC # BLD AUTO: 5.04 M/UL (ref 4–5.2)
SODIUM SERPL-SCNC: 140 MMOL/L (ref 136–145)
TROPONIN, HIGH SENSITIVITY: <6 NG/L (ref 0–14)
WBC # BLD AUTO: 7.4 K/UL (ref 4–11)

## 2024-11-10 PROCEDURE — 84484 ASSAY OF TROPONIN QUANT: CPT

## 2024-11-10 PROCEDURE — 99285 EMERGENCY DEPT VISIT HI MDM: CPT

## 2024-11-10 PROCEDURE — 85379 FIBRIN DEGRADATION QUANT: CPT

## 2024-11-10 PROCEDURE — 84703 CHORIONIC GONADOTROPIN ASSAY: CPT

## 2024-11-10 PROCEDURE — 93005 ELECTROCARDIOGRAM TRACING: CPT | Performed by: PHYSICIAN ASSISTANT

## 2024-11-10 PROCEDURE — 6360000004 HC RX CONTRAST MEDICATION: Performed by: PHYSICIAN ASSISTANT

## 2024-11-10 PROCEDURE — 80053 COMPREHEN METABOLIC PANEL: CPT

## 2024-11-10 PROCEDURE — 71260 CT THORAX DX C+: CPT

## 2024-11-10 PROCEDURE — 85025 COMPLETE CBC W/AUTO DIFF WBC: CPT

## 2024-11-10 RX ORDER — IOPAMIDOL 755 MG/ML
75 INJECTION, SOLUTION INTRAVASCULAR
Status: COMPLETED | OUTPATIENT
Start: 2024-11-10 | End: 2024-11-10

## 2024-11-10 RX ORDER — SERTRALINE HYDROCHLORIDE 25 MG/1
TABLET, FILM COATED ORAL
COMMUNITY
Start: 2024-11-08

## 2024-11-10 RX ADMIN — IOPAMIDOL 75 ML: 755 INJECTION, SOLUTION INTRAVENOUS at 20:06

## 2024-11-10 ASSESSMENT — PAIN - FUNCTIONAL ASSESSMENT: PAIN_FUNCTIONAL_ASSESSMENT: 0-10

## 2024-11-10 ASSESSMENT — PAIN DESCRIPTION - DESCRIPTORS: DESCRIPTORS: ACHING;DISCOMFORT

## 2024-11-10 ASSESSMENT — PAIN SCALES - GENERAL: PAINLEVEL_OUTOF10: 4

## 2024-11-10 ASSESSMENT — PAIN DESCRIPTION - ORIENTATION: ORIENTATION: MID

## 2024-11-10 ASSESSMENT — PAIN DESCRIPTION - LOCATION: LOCATION: HEAD

## 2024-11-10 ASSESSMENT — LIFESTYLE VARIABLES
HOW MANY STANDARD DRINKS CONTAINING ALCOHOL DO YOU HAVE ON A TYPICAL DAY: 1 OR 2
HOW OFTEN DO YOU HAVE A DRINK CONTAINING ALCOHOL: MONTHLY OR LESS

## 2024-11-11 ENCOUNTER — TRANSCRIBE ORDERS (OUTPATIENT)
Dept: ADMINISTRATIVE | Age: 24
End: 2024-11-11

## 2024-11-11 DIAGNOSIS — R06.00 DYSPNEA, UNSPECIFIED TYPE: Primary | ICD-10-CM

## 2024-11-11 LAB
EKG ATRIAL RATE: 90 BPM
EKG DIAGNOSIS: NORMAL
EKG P AXIS: 76 DEGREES
EKG P-R INTERVAL: 120 MS
EKG Q-T INTERVAL: 348 MS
EKG QRS DURATION: 90 MS
EKG QTC CALCULATION (BAZETT): 425 MS
EKG R AXIS: 88 DEGREES
EKG T AXIS: 55 DEGREES
EKG VENTRICULAR RATE: 90 BPM

## 2024-11-11 PROCEDURE — 93010 ELECTROCARDIOGRAM REPORT: CPT | Performed by: INTERNAL MEDICINE

## 2024-11-11 NOTE — DISCHARGE INSTRUCTIONS
You were seen in the emergency department today for concerns of shortness of breath with an elevated D-dimer.  Your CT PE study shows no evidence of PE.  They did notice questionable early interstitial infiltrate in the right lower lobe and recommend a follow-up chest x-ray which can be performed by your primary care physician.  Regarding findings on CT chest I would recommend follow-up with pulmonology in addition to primary care.  Your workup otherwise is very reassuring.

## 2024-11-11 NOTE — ED PROVIDER NOTES
I did not have face-to-face interaction with this patient. I did not discuss the case with the advanced practice provider. I was available for consultation on the patient if deemed necessary by advanced practice provider.  EKG interpreted by me  Normal sinus rhythm with rate of 90, no definite ischemic findings, normal intervals, no prior EKG available for comparison.          Neil Aguilar MD  11/10/24 [id number]

## 2024-11-11 NOTE — ED PROVIDER NOTES
[facility]  ED  EMERGENCY DEPARTMENT ENCOUNTER        Pt Name: Tamie Barboza  MRN: [id number]  Birthdate 2000  Date of evaluation: 11/10/2024  Provider: NHUNG Augustin  PCP: Lee Ann Goode APRN - CNP  Note Started: 7:19 PM EST 11/10/24      GRZEGORZ. I have evaluated this patient.        CHIEF COMPLAINT       Chief Complaint   Patient presents with    Abnormal Labs - Elevated D dimer      Patient reports that had blood work drawn on Friday and labs resulted today. Patient called provider, d dimer elevated. Patient was told to come to ED for scan.        HISTORY OF PRESENT ILLNESS: 1 or more Elements     History from : Patient    Limitations to history : None    Tamie Barboza is a 24 y.o. female who presents to the emergency department for concerns of an elevated D-dimer.  Patient states she has been dealing with shortness of breath, lightheadedness and palpitations for the past several weeks.  She saw her primary care doctor on Friday and had some lab work done.  She states she called her primary care doctor regarding the elevated D-dimer and she was referred to the emergency department for CT PE study.  She denies any recent travel.  No leg swelling.  She denies any past medical history.  Her symptoms have been ongoing for several weeks.  She states on Friday her primary care doctor has given her referrals for pulmonologist, cardiologist, and a GI doctor.  She also sees a therapist to has put her in contact with a nutritionist.  She states that she was previously told her cholesterol was a little bit elevated which made her very concerned about what she was eating and ultimately just ended up not eating.  She sees a nutritionist on Tuesday.    Nursing Notes were all reviewed and agreed with or any disagreements were addressed in the HPI.    REVIEW OF SYSTEMS :      Review of Systems    Positives and Pertinent negatives as per HPI.     SURGICAL HISTORY     Past Surgical History:

## 2024-11-13 ENCOUNTER — HOSPITAL ENCOUNTER (OUTPATIENT)
Age: 24
Discharge: HOME | End: 2024-11-13
Payer: COMMERCIAL

## 2024-11-13 ENCOUNTER — HOSPITAL ENCOUNTER (OUTPATIENT)
Dept: GENERAL RADIOLOGY | Age: 24
Discharge: HOME | End: 2024-11-13
Payer: COMMERCIAL

## 2024-11-13 DIAGNOSIS — R93.89 ABNORMAL CT SCAN: ICD-10-CM

## 2024-11-13 PROCEDURE — 71046 X-RAY EXAM CHEST 2 VIEWS: CPT

## 2024-11-14 ENCOUNTER — HOSPITAL ENCOUNTER (OUTPATIENT)
Dept: PULMONOLOGY | Age: 24
Discharge: HOME | End: 2024-11-14
Payer: COMMERCIAL

## 2024-11-14 VITALS — OXYGEN SATURATION: 97 %

## 2024-11-14 DIAGNOSIS — R06.02 SHORTNESS OF BREATH: ICD-10-CM

## 2024-11-14 LAB
DLCO %PRED: 75 %
DLCO PRED: NORMAL
DLCO/VA %PRED: NORMAL
DLCO/VA PRED: NORMAL
DLCO/VA: NORMAL
DLCO: NORMAL
EXPIRATORY TIME-POST: NORMAL
EXPIRATORY TIME: NORMAL
FEF 25-75 %CHNG: NORMAL
FEF 25-75 POST %PRED: NORMAL
FEF 25-75% %PRED-PRE: NORMAL
FEF 25-75% PRED: NORMAL
FEF 25-75-POST: NORMAL
FEF 25-75-PRE: NORMAL
FEV1 %PRED-POST: 78 %
FEV1 %PRED-PRE: 81 %
FEV1 PRED: NORMAL
FEV1-POST: NORMAL
FEV1-PRE: NORMAL
FEV1/FVC %PRED-POST: NORMAL
FEV1/FVC %PRED-PRE: NORMAL
FEV1/FVC PRED: NORMAL
FEV1/FVC-POST: 90 %
FEV1/FVC-PRE: 91 %
FVC %PRED-POST: NORMAL
FVC %PRED-PRE: NORMAL
FVC PRED: NORMAL
FVC-POST: NORMAL
FVC-PRE: NORMAL
GAW %PRED: NORMAL
GAW PRED: NORMAL
GAW: NORMAL
IC PRE %PRED: NORMAL
IC PRED: NORMAL
IC: NORMAL
MEP: NORMAL
MIP: NORMAL
MVV %PRED-PRE: NORMAL
MVV PRED: NORMAL
MVV-PRE: NORMAL
PEF %PRED-POST: NORMAL
PEF %PRED-PRE: NORMAL
PEF PRED: NORMAL
PEF%CHNG: NORMAL
PEF-POST: NORMAL
PEF-PRE: NORMAL
RAW %PRED: NORMAL
RAW PRED: NORMAL
RAW: NORMAL
RV PRE %PRED: NORMAL
RV PRED: NORMAL
RV: NORMAL
SVC %PRED: NORMAL
SVC PRED: NORMAL
SVC: NORMAL
TLC PRE %PRED: 87 %
TLC PRED: NORMAL
TLC: NORMAL
VA %PRED: NORMAL
VA PRED: NORMAL
VA: NORMAL
VTG %PRED: NORMAL
VTG PRED: NORMAL
VTG: NORMAL

## 2024-11-14 PROCEDURE — 94640 AIRWAY INHALATION TREATMENT: CPT

## 2024-11-14 PROCEDURE — 94729 DIFFUSING CAPACITY: CPT

## 2024-11-14 PROCEDURE — 6370000000 HC RX 637 (ALT 250 FOR IP)

## 2024-11-14 PROCEDURE — 94726 PLETHYSMOGRAPHY LUNG VOLUMES: CPT

## 2024-11-14 PROCEDURE — 94760 N-INVAS EAR/PLS OXIMETRY 1: CPT

## 2024-11-14 PROCEDURE — 94060 EVALUATION OF WHEEZING: CPT

## 2024-11-14 RX ORDER — ALBUTEROL SULFATE 90 UG/1
4 INHALANT RESPIRATORY (INHALATION) ONCE
Status: COMPLETED | OUTPATIENT
Start: 2024-11-14 | End: 2024-11-14

## 2024-11-14 RX ADMIN — ALBUTEROL SULFATE 4 PUFF: 90 AEROSOL, METERED RESPIRATORY (INHALATION) at 09:42

## 2024-11-14 ASSESSMENT — PULMONARY FUNCTION TESTS
FEV1_PERCENT_PREDICTED_POST: 78
FEV1_PERCENT_PREDICTED_PRE: 81
FEV1/FVC_POST: 90
FEV1/FVC_PRE: 91

## 2024-11-15 NOTE — PROCEDURES
[address] [address]                           PULMONARY FUNCTION      PATIENT NAME: DIANE BUCIO              : 2000  MED REC NO: [id number]                      ROOM:   ACCOUNT NO: [id number]                       ADMIT DATE: 2024  PROVIDER: Paul Vale MD      DATE OF PROCEDURE: 2024    SURGEON:  Paul Vale MD    REFERRING PHYSICIAN:  LUIS ARMANDO CLAY    DIAGNOSIS:  Shortness of breath.    FINDINGS:    1. Spirometry: The FVC 76% predicted.  The FEV1 is 81% predicted.  The FEV1/FVC ratio is 0.91.  There is no response to bronchodilators.    2. Plethysmography: The total lung capacity is 87% of predicted.  3. Diffusion capacity to carbon monoxide is 75% predicted.    4. Flow volume is normal.    IMPRESSION:  This patient has normal spirometry with no obstructive defect.  Lung volumes and DLCO are also preserved.          PAUL VALE MD      D:  2024 19:12:23     T:  11/15/2024 02:33:29     /MIRIAMS  Job #:  [id number]     Doc#:  [id number]

## 2024-11-18 ENCOUNTER — HOSPITAL ENCOUNTER (OUTPATIENT)
Age: 24
Discharge: HOME | End: 2024-11-18
Payer: COMMERCIAL

## 2024-11-18 ENCOUNTER — HOSPITAL ENCOUNTER (OUTPATIENT)
Dept: GENERAL RADIOLOGY | Age: 24
Discharge: HOME | End: 2024-11-18
Payer: COMMERCIAL

## 2024-11-18 DIAGNOSIS — R11.0 CHRONIC NAUSEA: ICD-10-CM

## 2024-11-18 PROCEDURE — 74018 RADEX ABDOMEN 1 VIEW: CPT

## 2025-01-30 ENCOUNTER — OFFICE VISIT (OUTPATIENT)
Age: 25
End: 2025-01-30

## 2025-01-30 VITALS
DIASTOLIC BLOOD PRESSURE: 64 MMHG | HEART RATE: 70 BPM | SYSTOLIC BLOOD PRESSURE: 104 MMHG | OXYGEN SATURATION: 99 % | WEIGHT: 145 LBS | HEIGHT: 67 IN | BODY MASS INDEX: 22.76 KG/M2 | TEMPERATURE: 98 F

## 2025-01-30 DIAGNOSIS — R30.0 DYSURIA: ICD-10-CM

## 2025-01-30 DIAGNOSIS — R10.2 SUPRAPUBIC PAIN: Primary | ICD-10-CM

## 2025-01-30 LAB
BILIRUBIN, POC: NORMAL
BLOOD URINE, POC: NORMAL
CLARITY, POC: CLEAR
COLOR, POC: YELLOW
CONTROL: NORMAL
GLUCOSE URINE, POC: NORMAL MG/DL
KETONES, POC: NORMAL MG/DL
LEUKOCYTE EST, POC: NORMAL
NITRITE, POC: NORMAL
PH, POC: 5.5
PREGNANCY TEST URINE, POC: NEGATIVE
PROTEIN, POC: NORMAL MG/DL
SPECIFIC GRAVITY, POC: 1.02
UROBILINOGEN, POC: 0.2 MG/DL

## 2025-01-30 RX ORDER — CEPHALEXIN 500 MG/1
500 CAPSULE ORAL 2 TIMES DAILY
Qty: 10 CAPSULE | Refills: 0 | Status: SHIPPED | OUTPATIENT
Start: 2025-01-30 | End: 2025-02-04

## 2025-01-30 NOTE — PROGRESS NOTES
Tamie Barboza (:  2000) is a 24 y.o. female,  here for evaluation of the following chief complaint(s): Dysuria and Abdominal Pain (Pain after bowel movement)    Tamie Barboza is a: Established patient.   Last Urgent Care Visit: 10/26/2024  I have reviewed the patient's medications and basic medical history; see Medication Reconciliation.    ASSESSMENT/PLAN:  Diagnosis:     ICD-10-CM    1. Suprapubic pain  R10.2 [facility] Urogynecology and Female Pelvic Medicine     POCT urine pregnancy      2. Dysuria  R30.0 POCT Urinalysis no Micro     Culture, Urine     cephALEXin (KEFLEX) 500 MG capsule     [facility] Urogynecology and Female Pelvic Medicine     CANCELED: HCG, QUANTITATIVE, PREGNANCY             Medical Decision Making:   Patient was seen at Urgent Care today for suprapubic pain both before and after urination. She has no dysuria while urinating and pain is only before urination when she feels like she has a full bladder and after urinating for about 30-45 minutes.   She has some increased frequency but no hesitancy, hematuria or dysuria and reports this does not feel like any previous UTIs she has had.    On exam she appears well.  She is afebrile.  There is some mild suprapubic tenderness only.  Remainder of abdomen is benign.  There is no CVA tenderness.    Urinalysis is obtained here in clinic and is negative.  There is no nitrites or leukocytes.  No hematuria.  Urine pregnancy is added and is also negative.    There certainly appears to be more suprapubic and pelvic as opposed to abdominal.  Exact cause of the patient's symptoms is unclear at this time.  They do certainly appear to be directly correlated with full bladder and timeframe after urination so I do feel this is likely more urologic.    Patient is referred to urogynecology for additional evaluation and management.  In the meantime we will empirically treat with Keflex for antibiotic coverage while awaiting urine culture

## 2025-01-30 NOTE — PATIENT INSTRUCTIONS
Urine here was clear.   A urine culture has been sent and will result in about 2 days.     In the meantime, start antibiotic.     Follow-up with urogynecology for additional evaluation. Call office and explain you were at urgent care and diagnosed with pelvic pain before and after you urinate and you were told to follow-up with them. They will schedule you appropriately.

## 2025-02-01 LAB — BACTERIA UR CULT: NORMAL

## 2025-03-07 ENCOUNTER — OFFICE VISIT (OUTPATIENT)
Age: 25
End: 2025-03-07

## 2025-03-07 VITALS
BODY MASS INDEX: 22.76 KG/M2 | HEART RATE: 113 BPM | DIASTOLIC BLOOD PRESSURE: 70 MMHG | HEIGHT: 67 IN | OXYGEN SATURATION: 97 % | WEIGHT: 145 LBS | SYSTOLIC BLOOD PRESSURE: 114 MMHG | TEMPERATURE: 97 F

## 2025-03-07 DIAGNOSIS — J10.1 INFLUENZA A: ICD-10-CM

## 2025-03-07 DIAGNOSIS — J40 BRONCHITIS: ICD-10-CM

## 2025-03-07 DIAGNOSIS — R05.3 CHRONIC COUGH: Primary | ICD-10-CM

## 2025-03-07 LAB
INFLUENZA A ANTIGEN, POC: POSITIVE
INFLUENZA B ANTIGEN, POC: ABNORMAL
Lab: ABNORMAL
PERFORMING INSTRUMENT: ABNORMAL
QC PASS/FAIL: ABNORMAL
SARS-COV-2, POC: ABNORMAL

## 2025-03-07 RX ORDER — OSELTAMIVIR PHOSPHATE 75 MG/1
75 CAPSULE ORAL 2 TIMES DAILY
Qty: 10 CAPSULE | Refills: 0 | Status: SHIPPED | OUTPATIENT
Start: 2025-03-07 | End: 2025-03-12

## 2025-03-07 RX ORDER — AZITHROMYCIN 250 MG/1
TABLET, FILM COATED ORAL
Qty: 6 TABLET | Refills: 0 | Status: SHIPPED | OUTPATIENT
Start: 2025-03-07 | End: 2025-03-17

## 2025-03-07 RX ORDER — PREDNISONE 20 MG/1
20 TABLET ORAL DAILY
Qty: 5 TABLET | Refills: 0 | Status: SHIPPED | OUTPATIENT
Start: 2025-03-07 | End: 2025-03-12

## 2025-03-07 RX ORDER — BENZONATATE 100 MG/1
100 CAPSULE ORAL
Qty: 30 CAPSULE | Refills: 0 | Status: SHIPPED | OUTPATIENT
Start: 2025-03-07

## 2025-03-07 ASSESSMENT — ENCOUNTER SYMPTOMS: COUGH: 1

## 2025-03-07 NOTE — PROGRESS NOTES
Height: 1.702 m (5' 7\")       Review of Systems   Constitutional:  Negative for fever.   HENT:  Positive for congestion. Negative for postnasal drip and sore throat.    Respiratory:  Positive for cough. Negative for choking, shortness of breath and wheezing.    Cardiovascular:  Negative for chest pain.   Gastrointestinal:  Negative for abdominal pain, diarrhea, nausea and vomiting.   Musculoskeletal:  Negative for myalgias.       Physical Exam    Physical  Vitals signs: reviewed  Constitutional:  appearance: well nourished ..  does not appear acutely ill  ..no distress   Eyes:                 Pupil: equal-round-reactive to light, no photophobia, EOMI            Cornea: clear            Sclera: clear, non injected, non icteric    Ears: Right canal clear / TM normal           Left ear canal clear / TM normal  Nose/Sinus:  no nasal congestion/no drainage   Mouth:                 Mucous membranes moist               Oropharynx:  clear, no erythema, no exudates, voice normal  Neck:    supple, non tender,               No cervical lymph nodes   Pulmonary/Lungs:  effort normal, no stridor                                 Auscultation: good air movement / breath sounds normal  Cardio-vascular:  normal rate and rhythm                              Heart sounds normal-no murmur- no rub   Abdomen:    soft .. Non tender,    Muscular skeleton:  motor strength normal / muscle tone normal                                  Extremities/joints: no tenderness, normal movements                                  Lower extremities: no calf tenderness  Neurological:  no focal deficit  Skin: no rash   Psychiatric:   behavior appropriate--no confusion    An electronic signature was used to authenticate this note.    --Jeff Mei MD

## 2025-03-08 ENCOUNTER — RESULTS FOLLOW-UP (OUTPATIENT)
Age: 25
End: 2025-03-08

## 2025-03-08 ENCOUNTER — TELEPHONE (OUTPATIENT)
Age: 25
End: 2025-03-08

## 2025-03-08 ASSESSMENT — ENCOUNTER SYMPTOMS
SORE THROAT: 0
DIARRHEA: 0
VOMITING: 0
NAUSEA: 0
WHEEZING: 0
CHOKING: 0
ABDOMINAL PAIN: 0
SHORTNESS OF BREATH: 0

## 2025-04-03 ENCOUNTER — TELEPHONE (OUTPATIENT)
Dept: CARDIOLOGY CLINIC | Age: 25
End: 2025-04-03

## 2025-04-03 ENCOUNTER — OFFICE VISIT (OUTPATIENT)
Dept: FAMILY MEDICINE CLINIC | Age: 25
End: 2025-04-03
Payer: MEDICAID

## 2025-04-03 VITALS
HEART RATE: 117 BPM | BODY MASS INDEX: 23.02 KG/M2 | DIASTOLIC BLOOD PRESSURE: 60 MMHG | SYSTOLIC BLOOD PRESSURE: 80 MMHG | OXYGEN SATURATION: 98 % | WEIGHT: 147 LBS

## 2025-04-03 DIAGNOSIS — I95.1 ORTHOSTATIC HYPOTENSION: ICD-10-CM

## 2025-04-03 DIAGNOSIS — Z13.220 LIPID SCREENING: ICD-10-CM

## 2025-04-03 DIAGNOSIS — R00.2 PALPITATIONS: Primary | ICD-10-CM

## 2025-04-03 DIAGNOSIS — F41.9 ANXIETY: ICD-10-CM

## 2025-04-03 DIAGNOSIS — R05.3 CHRONIC COUGH: ICD-10-CM

## 2025-04-03 DIAGNOSIS — R93.89 ABNORMAL CHEST X-RAY: ICD-10-CM

## 2025-04-03 DIAGNOSIS — R00.2 PALPITATIONS: ICD-10-CM

## 2025-04-03 LAB
ALBUMIN SERPL-MCNC: 4.2 G/DL (ref 3.4–5)
ALBUMIN/GLOB SERPL: 1.7 {RATIO} (ref 1.1–2.2)
ALP SERPL-CCNC: 35 U/L (ref 40–129)
ALT SERPL-CCNC: 44 U/L (ref 10–40)
ANION GAP SERPL CALCULATED.3IONS-SCNC: 8 MMOL/L (ref 3–16)
AST SERPL-CCNC: 31 U/L (ref 15–37)
BILIRUB SERPL-MCNC: 0.5 MG/DL (ref 0–1)
BUN SERPL-MCNC: 7 MG/DL (ref 7–20)
CALCIUM SERPL-MCNC: 9.3 MG/DL (ref 8.3–10.6)
CHLORIDE SERPL-SCNC: 104 MMOL/L (ref 99–110)
CHOLEST SERPL-MCNC: 209 MG/DL (ref 0–199)
CO2 SERPL-SCNC: 27 MMOL/L (ref 21–32)
CREAT SERPL-MCNC: 0.7 MG/DL (ref 0.6–1.1)
DEPRECATED RDW RBC AUTO: 13.7 % (ref 12.4–15.4)
GFR SERPLBLD CREATININE-BSD FMLA CKD-EPI: >90 ML/MIN/{1.73_M2}
GLUCOSE SERPL-MCNC: 97 MG/DL (ref 70–99)
HCT VFR BLD AUTO: 39.5 % (ref 36–48)
HDLC SERPL-MCNC: 61 MG/DL (ref 40–60)
HGB BLD-MCNC: 13.3 G/DL (ref 12–16)
LDLC SERPL CALC-MCNC: 132 MG/DL
MAGNESIUM SERPL-MCNC: 1.96 MG/DL (ref 1.8–2.4)
MCH RBC QN AUTO: 29 PG (ref 26–34)
MCHC RBC AUTO-ENTMCNC: 33.6 G/DL (ref 31–36)
MCV RBC AUTO: 86.4 FL (ref 80–100)
PLATELET # BLD AUTO: 225 K/UL (ref 135–450)
PMV BLD AUTO: 10 FL (ref 5–10.5)
POTASSIUM SERPL-SCNC: 4.2 MMOL/L (ref 3.5–5.1)
PROT SERPL-MCNC: 6.7 G/DL (ref 6.4–8.2)
RBC # BLD AUTO: 4.58 M/UL (ref 4–5.2)
SODIUM SERPL-SCNC: 139 MMOL/L (ref 136–145)
TRIGL SERPL-MCNC: 79 MG/DL (ref 0–150)
TSH SERPL DL<=0.005 MIU/L-ACNC: 1.03 UIU/ML (ref 0.27–4.2)
VLDLC SERPL CALC-MCNC: 16 MG/DL
WBC # BLD AUTO: 5.2 K/UL (ref 4–11)

## 2025-04-03 PROCEDURE — 93000 ELECTROCARDIOGRAM COMPLETE: CPT | Performed by: NURSE PRACTITIONER

## 2025-04-03 PROCEDURE — 99204 OFFICE O/P NEW MOD 45 MIN: CPT | Performed by: NURSE PRACTITIONER

## 2025-04-03 RX ORDER — FLUTICASONE PROPIONATE 50 MCG
2 SPRAY, SUSPENSION (ML) NASAL DAILY
Qty: 16 G | Refills: 0 | Status: SHIPPED | OUTPATIENT
Start: 2025-04-03

## 2025-04-03 SDOH — ECONOMIC STABILITY: FOOD INSECURITY: WITHIN THE PAST 12 MONTHS, THE FOOD YOU BOUGHT JUST DIDN'T LAST AND YOU DIDN'T HAVE MONEY TO GET MORE.: NEVER TRUE

## 2025-04-03 SDOH — ECONOMIC STABILITY: FOOD INSECURITY: WITHIN THE PAST 12 MONTHS, YOU WORRIED THAT YOUR FOOD WOULD RUN OUT BEFORE YOU GOT MONEY TO BUY MORE.: NEVER TRUE

## 2025-04-03 ASSESSMENT — PATIENT HEALTH QUESTIONNAIRE - PHQ9
8. MOVING OR SPEAKING SO SLOWLY THAT OTHER PEOPLE COULD HAVE NOTICED. OR THE OPPOSITE, BEING SO FIGETY OR RESTLESS THAT YOU HAVE BEEN MOVING AROUND A LOT MORE THAN USUAL: NEARLY EVERY DAY
6. FEELING BAD ABOUT YOURSELF - OR THAT YOU ARE A FAILURE OR HAVE LET YOURSELF OR YOUR FAMILY DOWN: MORE THAN HALF THE DAYS
1. LITTLE INTEREST OR PLEASURE IN DOING THINGS: NOT AT ALL
9. THOUGHTS THAT YOU WOULD BE BETTER OFF DEAD, OR OF HURTING YOURSELF: NOT AT ALL
2. FEELING DOWN, DEPRESSED OR HOPELESS: NOT AT ALL
SUM OF ALL RESPONSES TO PHQ QUESTIONS 1-9: 0
5. POOR APPETITE OR OVEREATING: NEARLY EVERY DAY
2. FEELING DOWN, DEPRESSED OR HOPELESS: SEVERAL DAYS
SUM OF ALL RESPONSES TO PHQ QUESTIONS 1-9: 0
SUM OF ALL RESPONSES TO PHQ QUESTIONS 1-9: 19
SUM OF ALL RESPONSES TO PHQ QUESTIONS 1-9: 0
10. IF YOU CHECKED OFF ANY PROBLEMS, HOW DIFFICULT HAVE THESE PROBLEMS MADE IT FOR YOU TO DO YOUR WORK, TAKE CARE OF THINGS AT HOME, OR GET ALONG WITH OTHER PEOPLE: VERY DIFFICULT
1. LITTLE INTEREST OR PLEASURE IN DOING THINGS: MORE THAN HALF THE DAYS
7. TROUBLE CONCENTRATING ON THINGS, SUCH AS READING THE NEWSPAPER OR WATCHING TELEVISION: MORE THAN HALF THE DAYS
3. TROUBLE FALLING OR STAYING ASLEEP: NEARLY EVERY DAY
SUM OF ALL RESPONSES TO PHQ QUESTIONS 1-9: 0
SUM OF ALL RESPONSES TO PHQ QUESTIONS 1-9: 19
4. FEELING TIRED OR HAVING LITTLE ENERGY: NEARLY EVERY DAY
SUM OF ALL RESPONSES TO PHQ QUESTIONS 1-9: 19
SUM OF ALL RESPONSES TO PHQ QUESTIONS 1-9: 19

## 2025-04-03 ASSESSMENT — ENCOUNTER SYMPTOMS
CHEST TIGHTNESS: 0
DIARRHEA: 0
SHORTNESS OF BREATH: 0
CONSTIPATION: 0
COUGH: 0
VOMITING: 0
WHEEZING: 0
NAUSEA: 0

## 2025-04-03 NOTE — PROGRESS NOTES
4/3/2025  Tamie Barboza (: 2000)  25 y.o.    ASSESSMENT and PLAN:  Tamie was seen today for new patient and cough.    Diagnoses and all orders for this visit:    Palpitations  -     Extended cardiac holter monitor (3 days-14 day); Future  -     Comprehensive Metabolic Panel; Future  -     CBC; Future  -     TSH reflex to FT4; Future  -     Magnesium; Future  -     EKG 12 lead; Future  -     EKG 12 lead    Orthostatic hypotension  -     Monico Israel MD, Cardiac Electrophysiology, [facility]    Abnormal chest x-ray  -     CT CHEST W WO CONTRAST; Future    Lipid screening  -     LIPID PANEL; Future    Chronic cough  -     fluticasone (FLONASE) 50 MCG/ACT nasal spray; 2 sprays by Each Nostril route daily    Update labs-fasting today  Positive orthostasis on exam today. Update EKG. Symptoms may be consistent with POTs, recommend outpatient heart monitor, evaluation with EP. Discussed increasing salt consumption, compression socks, increasing hydration, and safety.   Obtain CT chest to further evaluate chronic cough and abnormal xray.   Recommend vaping cessation as may be contributing to chronic inflammation.   Needs to reschedule upper endoscopy, discussed gerd treatment and diet recommendations  Trial flonase and zyrtec for post nasal drip.     Return in about 3 months (around 7/3/2025), or if symptoms worsen or fail to improve.    HPI  Presenting as to establish as new patient.     Had a cough for a few months. Had flu in early march. Cough is worse after eating. Has completed. Currently on omeprazole and pepcid.   Doesn't drink alcohol. Is a -symptoms worse at work.     Anxiety-feels it is medical related. Follows therapist. On hydroxyzine prn at night.     Zofran-nausea prn.     Having dizziness when standing. Ongoing for since highschool. Intermittent palpitations-HR can range from low to high. Saw Dr. CASIMIRO velasco.     Having regular menstrual cycles. Stopped ring.

## 2025-04-03 NOTE — TELEPHONE ENCOUNTER
Pt called office, there is a referral in chart for pt to see EP, pt is being referred for palpitations and Orthostatic hypotension. Pt is having a SUMA placed on 4/7/25 @8AM in [place]. Please advise when pt should be seen by EP. If it should be sooner than next available. Thank you!

## 2025-04-03 NOTE — TELEPHONE ENCOUNTER
Per EP MOHIT Burger- Please schedule pt for general cardio. Please make sure appt is schedule for two weeks after monitor appt placement.

## 2025-04-04 ENCOUNTER — RESULTS FOLLOW-UP (OUTPATIENT)
Dept: FAMILY MEDICINE CLINIC | Age: 25
End: 2025-04-04

## 2025-04-04 DIAGNOSIS — R79.89 ELEVATED LFTS: Primary | ICD-10-CM

## 2025-04-04 DIAGNOSIS — J44.89 CHRONIC BRONCHIOLITIS (HCC): ICD-10-CM

## 2025-04-04 NOTE — TELEPHONE ENCOUNTER
4/4 Called [phone]. LVM for pt to call and schedule appt with gen around end of May to discuss monitor results

## 2025-04-04 NOTE — TELEPHONE ENCOUNTER
The soonest cce appt w/ an EPMD is w/ KXA on 6/25 at [place]. If pt needs to be seen sooner, can an ob time be offered? Please advise.

## 2025-04-04 NOTE — TELEPHONE ENCOUNTER
The soonest cce appt w/ blu URIARTE is marina GALLOWAY on 5/1 at [place]. Two weeks from 4/7 is 4/21 and no appts are currently available. Can a cce ob be offered for pt?

## 2025-04-08 NOTE — TELEPHONE ENCOUNTER
Called pt at [phone] to schedule appt w/ general cardiogist for a 2wk monitor placement f/u appt. NOV is scheduled for 5/1/25 at 330pm w/ LAVERNE at [place].

## 2025-04-09 ENCOUNTER — ANCILLARY PROCEDURE (OUTPATIENT)
Dept: CARDIOLOGY CLINIC | Age: 25
End: 2025-04-09

## 2025-04-09 ENCOUNTER — TELEPHONE (OUTPATIENT)
Dept: CARDIOLOGY CLINIC | Age: 25
End: 2025-04-09

## 2025-04-09 DIAGNOSIS — R00.2 PALPITATIONS: ICD-10-CM

## 2025-04-09 NOTE — TELEPHONE ENCOUNTER
Monitor placed by  charlotte lyon  Monitor company [facility]  Length of monitor 7days  Monitor ordered by pcp stephania schwab  Serial number  [id number]  Activation successful prior to pt leaving office? Yes

## 2025-04-14 ENCOUNTER — TELEPHONE (OUTPATIENT)
Dept: CARDIOLOGY CLINIC | Age: 25
End: 2025-04-14

## 2025-04-14 NOTE — TELEPHONE ENCOUNTER
I called and spoke with pt that monitor gets dropped off at [facility] and whenever she is able to drop it off is okay. Pt v/u.

## 2025-04-14 NOTE — TELEPHONE ENCOUNTER
Pt called to report that she was to bring her monitor back to our office around 1:00pm tomorrow; however pt states that she has to work and will be able to drop it off around 1000 or 1100am tomorrow. Pt would like to know if this is a problem. Please advise.     Best number to return call is [phone].

## 2025-04-22 NOTE — TELEPHONE ENCOUNTER
Received phone call from the patient in regards to with her liver enzumes being elevated she is now congested and coughing asking if ok to still take zyrtec and mucinex. Also can she take both these at the same time. Please advise

## 2025-05-01 ENCOUNTER — RESULTS FOLLOW-UP (OUTPATIENT)
Dept: FAMILY MEDICINE CLINIC | Age: 25
End: 2025-05-01

## 2025-05-01 ENCOUNTER — OFFICE VISIT (OUTPATIENT)
Dept: CARDIOLOGY CLINIC | Age: 25
End: 2025-05-01
Payer: MEDICAID

## 2025-05-01 ENCOUNTER — HOSPITAL ENCOUNTER (OUTPATIENT)
Dept: CT IMAGING | Age: 25
Discharge: HOME | End: 2025-05-01
Payer: MEDICAID

## 2025-05-01 VITALS
HEART RATE: 80 BPM | HEIGHT: 67 IN | SYSTOLIC BLOOD PRESSURE: 112 MMHG | BODY MASS INDEX: 23.07 KG/M2 | DIASTOLIC BLOOD PRESSURE: 78 MMHG | WEIGHT: 147 LBS | OXYGEN SATURATION: 98 %

## 2025-05-01 DIAGNOSIS — R42 DIZZINESS: ICD-10-CM

## 2025-05-01 DIAGNOSIS — R79.89 ELEVATED LFTS: ICD-10-CM

## 2025-05-01 DIAGNOSIS — R00.2 PALPITATIONS: ICD-10-CM

## 2025-05-01 DIAGNOSIS — R05.3 CHRONIC COUGH: ICD-10-CM

## 2025-05-01 DIAGNOSIS — R07.89 CHEST PRESSURE: ICD-10-CM

## 2025-05-01 DIAGNOSIS — R79.89 ABNORMAL LFTS: Primary | ICD-10-CM

## 2025-05-01 DIAGNOSIS — R93.89 ABNORMAL CHEST X-RAY: ICD-10-CM

## 2025-05-01 DIAGNOSIS — R06.09 DOE (DYSPNEA ON EXERTION): ICD-10-CM

## 2025-05-01 DIAGNOSIS — R55 NEAR SYNCOPE: Primary | ICD-10-CM

## 2025-05-01 LAB
ALBUMIN SERPL-MCNC: 4 G/DL (ref 3.4–5)
ALP SERPL-CCNC: 44 U/L (ref 40–129)
ALT SERPL-CCNC: 42 U/L (ref 10–40)
AST SERPL-CCNC: 33 U/L (ref 15–37)
BILIRUB DIRECT SERPL-MCNC: <0.1 MG/DL (ref 0–0.3)
BILIRUB INDIRECT SERPL-MCNC: ABNORMAL MG/DL (ref 0–1)
BILIRUB SERPL-MCNC: <0.2 MG/DL (ref 0–1)
PROT SERPL-MCNC: 6.3 G/DL (ref 6.4–8.2)

## 2025-05-01 PROCEDURE — 71260 CT THORAX DX C+: CPT

## 2025-05-01 PROCEDURE — G2211 COMPLEX E/M VISIT ADD ON: HCPCS | Performed by: INTERNAL MEDICINE

## 2025-05-01 PROCEDURE — 99204 OFFICE O/P NEW MOD 45 MIN: CPT | Performed by: INTERNAL MEDICINE

## 2025-05-01 PROCEDURE — 6360000004 HC RX CONTRAST MEDICATION: Performed by: NURSE PRACTITIONER

## 2025-05-01 RX ORDER — IOPAMIDOL 755 MG/ML
75 INJECTION, SOLUTION INTRAVASCULAR
Status: COMPLETED | OUTPATIENT
Start: 2025-05-01 | End: 2025-05-01

## 2025-05-01 RX ADMIN — IOPAMIDOL 75 ML: 755 INJECTION, SOLUTION INTRAVENOUS at 08:10

## 2025-05-01 NOTE — PROGRESS NOTES
Jugular venous pressure elevation    Lungs:   Clear to auscultation bilaterally,  no respiratory distress   Chest Wall:  No deformity or tenderness to palpation   Heart:  Regular rate and rhythm, normal S1, normal S2    Abdomen:   Soft, non-tender, with normoactive bowel sounds. No masses, no hepatosplenomegaly   Extremities: No cyanosis, clubbing or pitting edema.    Vascular: 2+ radial, brachial, femoral, dorsalis pedis and posterior tibial pulses bilaterally. Brisk carotid upstrokes without carotid bruit.    Skin: Skin color, texture, turgor are normal with no rashes or ulceration.    Pysch: Euthymic mood, appropriate affect   Neurologic: Oriented to person, place and time. No slurred speech or facial asymmetry. No motor or sensory deficits on gross examination.         Labs:   CBC:   Lab Results   Component Value Date/Time    WBC 5.2 04/03/2025 10:15 AM    RBC 4.58 04/03/2025 10:15 AM    HGB 13.3 04/03/2025 10:15 AM    HCT 39.5 04/03/2025 10:15 AM    MCV 86.4 04/03/2025 10:15 AM    RDW 13.7 04/03/2025 10:15 AM     04/03/2025 10:15 AM     CMP:  Lab Results   Component Value Date/Time     04/03/2025 10:15 AM    K 4.2 04/03/2025 10:15 AM    K 3.6 11/10/2024 06:31 PM     04/03/2025 10:15 AM    CO2 27 04/03/2025 10:15 AM    BUN 7 04/03/2025 10:15 AM    CREATININE 0.7 04/03/2025 10:15 AM    GFRAA >60 04/15/2021 02:53 PM    AGRATIO 1.7 04/03/2025 10:15 AM    LABGLOM >90 04/03/2025 10:15 AM    LABGLOM >60 03/06/2024 11:17 PM    GLUCOSE 97 04/03/2025 10:15 AM    CALCIUM 9.3 04/03/2025 10:15 AM    BILITOT 0.5 04/03/2025 10:15 AM    ALKPHOS 35 04/03/2025 10:15 AM    AST 31 04/03/2025 10:15 AM    ALT 44 04/03/2025 10:15 AM     PT/INR:  No results found for: \"PTINR\"  HgBA1c:No results found for: \"LABA1C\"  Lab Results   Component Value Date    TROPONINI <0.01 01/22/2023     Lab Results   Component Value Date    CHOL 209 (H) 04/03/2025    TRIG 79 04/03/2025    HDL 61 (H) 04/03/2025     (H) 04/03/2025

## 2025-05-01 NOTE — PATIENT INSTRUCTIONS
Plan:  Orthostatic vitals today.   Recommend adequate hydration at least 2 liters daily.   Limit caffeine intake.   Recommend having salty snack and electrolyte drinks on hand for when you do not feel well.   Recommend smoking cessation: [id number]   Advised patient to quit and offered support.  Educational material provided to patient.   Follow up with me in 3 months.

## 2025-05-06 ENCOUNTER — HOSPITAL ENCOUNTER (OUTPATIENT)
Dept: ULTRASOUND IMAGING | Age: 25
Discharge: HOME | End: 2025-05-06
Payer: MEDICAID

## 2025-05-06 DIAGNOSIS — R79.89 ABNORMAL LFTS: ICD-10-CM

## 2025-05-06 PROCEDURE — 76705 ECHO EXAM OF ABDOMEN: CPT

## 2025-05-07 RX ORDER — PREDNISONE 20 MG/1
40 TABLET ORAL DAILY
Qty: 10 TABLET | Refills: 0 | Status: SHIPPED | OUTPATIENT
Start: 2025-05-07 | End: 2025-05-12

## 2025-05-13 ENCOUNTER — RESULTS FOLLOW-UP (OUTPATIENT)
Dept: FAMILY MEDICINE CLINIC | Age: 25
End: 2025-05-13

## 2025-05-13 DIAGNOSIS — R79.89 ABNORMAL LFTS: Primary | ICD-10-CM

## 2025-05-16 ENCOUNTER — OFFICE VISIT (OUTPATIENT)
Dept: FAMILY MEDICINE CLINIC | Age: 25
End: 2025-05-16
Payer: MEDICAID

## 2025-05-16 ENCOUNTER — RESULTS FOLLOW-UP (OUTPATIENT)
Dept: FAMILY MEDICINE CLINIC | Age: 25
End: 2025-05-16

## 2025-05-16 ENCOUNTER — HOSPITAL ENCOUNTER (OUTPATIENT)
Dept: GENERAL RADIOLOGY | Age: 25
Discharge: HOME | End: 2025-05-16
Payer: MEDICAID

## 2025-05-16 VITALS — SYSTOLIC BLOOD PRESSURE: 126 MMHG | DIASTOLIC BLOOD PRESSURE: 80 MMHG | OXYGEN SATURATION: 98 % | HEART RATE: 122 BPM

## 2025-05-16 DIAGNOSIS — M25.562 ACUTE PAIN OF LEFT KNEE: ICD-10-CM

## 2025-05-16 DIAGNOSIS — M25.562 ACUTE PAIN OF LEFT KNEE: Primary | ICD-10-CM

## 2025-05-16 PROCEDURE — 99213 OFFICE O/P EST LOW 20 MIN: CPT | Performed by: NURSE PRACTITIONER

## 2025-05-16 PROCEDURE — 73560 X-RAY EXAM OF KNEE 1 OR 2: CPT

## 2025-05-16 NOTE — PROGRESS NOTES
2025  Tamie Barboza (: 2000)  25 y.o.    ASSESSMENT and PLAN:  Tamie was seen today for knee pain.    Diagnoses and all orders for this visit:    Acute pain of left knee  -     Cancel: XR KNEE LEFT (3 VIEWS); Future    -rule out structural abnormality. Recommend f/u at ortho urgent care tonight since upcoming .  -recommend wearing brace, rest, elevation.   -can try nsaid/tylenol prn for pain control.     Return if symptoms worsen or fail to improve.    Knee Pain   Pertinent negatives include no numbness.     Presenting for left knee  Onset Tuesday.   Was [place] [employer] on .   Feels joint instability.   Pain is worse when standing, going up stairs, and extending leg.   Pain improves with rest.  Worse of lateral aspect of knee.   No redness or warmth.   Hasn't taking anything for pain. 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     Review of Systems   Constitutional:  Negative for activity change, appetite change, fatigue, fever and unexpected weight change.   Respiratory:  Negative for cough, chest tightness, shortness of breath and wheezing.    Cardiovascular:  Negative for chest pain, palpitations and leg swelling.   Gastrointestinal:  Negative for constipation, diarrhea, nausea and vomiting.   Genitourinary: Negative.  Negative for difficulty urinating and dysuria.   Musculoskeletal: Negative.  Negative for gait problem.   Neurological: Negative.  Negative for dizziness, syncope, weakness, light-headedness, numbness and headaches.   Psychiatric/Behavioral: Negative.     All other systems reviewed and are negative.      Allergies, past medical history, family history, and social history reviewed and unchanged from previous encounter.     Current Outpatient Medications   Medication Sig Dispense Refill    fluticasone (FLONASE) 50 MCG/ACT nasal spray 2 sprays by Each Nostril route daily (Patient not taking: Reported on 2025) 16 g 0    benzonatate

## 2025-06-30 ENCOUNTER — OFFICE VISIT (OUTPATIENT)
Age: 25
End: 2025-06-30

## 2025-06-30 VITALS
WEIGHT: 147 LBS | HEART RATE: 97 BPM | HEIGHT: 67 IN | DIASTOLIC BLOOD PRESSURE: 62 MMHG | OXYGEN SATURATION: 97 % | BODY MASS INDEX: 23.07 KG/M2 | SYSTOLIC BLOOD PRESSURE: 118 MMHG | TEMPERATURE: 98.4 F

## 2025-06-30 DIAGNOSIS — Z32.01 POSITIVE URINE PREGNANCY TEST: ICD-10-CM

## 2025-06-30 DIAGNOSIS — N91.2 AMENORRHEA: ICD-10-CM

## 2025-06-30 DIAGNOSIS — K05.219 GINGIVAL ABSCESS: Primary | ICD-10-CM

## 2025-06-30 LAB
CONTROL: ABNORMAL
PREGNANCY TEST URINE, POC: POSITIVE

## 2025-06-30 RX ORDER — AMOXICILLIN 500 MG/1
500 CAPSULE ORAL 3 TIMES DAILY
Qty: 30 CAPSULE | Refills: 0 | Status: SHIPPED | OUTPATIENT
Start: 2025-06-30 | End: 2025-07-10

## 2025-06-30 NOTE — PROGRESS NOTES
Tamie Barboza (: 2000) is a 25 y.o. female, Established patient, here for evaluation of the following chief complaint(s):  Dental Pain (Right side)      ASSESSMENT/PLAN:    ICD-10-CM    1. Gingival abscess  K05.219 amoxicillin (AMOXIL) 500 MG capsule      2. Amenorrhea  N91.2 POCT urine pregnancy      3. Positive urine pregnancy test  Z32.01             Amoxicillin is prescribed for antibiotic treatment of the dental infection  Take the antibiotics until completed, do not stop unless otherwise directed by a healthcare provider.  Recommend daily yogurt or other probiotics while on antibiotics.  Salt water gargles, especially after eating to keep food debris out of the tooth  Hot compresses over the area of jaw pain/swelling for 15-20 minutes several times per day  Continued brushing your teeth to keep the area of clean of any food debris.  MUST follow up with a dentist to address the underlying dental pathology to prevent the return of the infection.  If you develop worsening swelling, worsening pain, fevers, chills, nausea and vomiting, shortness of breath, or chest pain - follow up immediately with the ER for further evaluation.    New Prescriptions    AMOXICILLIN (AMOXIL) 500 MG CAPSULE    Take 1 capsule by mouth 3 times daily for 10 days      Discussed PCP follow up for persisting or worsening symptoms, or to return to the clinic if unable to obtain PCP follow up for worsening symptoms.    The patient tolerated their visit well. The patient was informed of the results of any tests. A time was given to answer questions and a plan was established, proposed, and was agreed upon. Reviewed AVS with treatment instructions and answered questions - patient acknowledges understanding and agreement with the discussed treatment plan and AVS instructions.        SUBJECTIVE/OBJECTIVE:  HPI:   25 y.o. female presents for complaint of right sided dental pain x 4 days.    Admits has had issues with right lower

## 2025-07-07 DIAGNOSIS — Z32.01 POSITIVE PREGNANCY TEST: Primary | ICD-10-CM

## 2025-07-10 ENCOUNTER — OFFICE VISIT (OUTPATIENT)
Dept: FAMILY MEDICINE CLINIC | Age: 25
End: 2025-07-10

## 2025-07-10 VITALS
SYSTOLIC BLOOD PRESSURE: 118 MMHG | DIASTOLIC BLOOD PRESSURE: 70 MMHG | WEIGHT: 144 LBS | OXYGEN SATURATION: 100 % | BODY MASS INDEX: 22.55 KG/M2 | HEART RATE: 78 BPM

## 2025-07-10 DIAGNOSIS — J43.2 CENTRILOBULAR EMPHYSEMA (HCC): ICD-10-CM

## 2025-07-10 DIAGNOSIS — Z3A.01 LESS THAN 8 WEEKS GESTATION OF PREGNANCY: ICD-10-CM

## 2025-07-10 DIAGNOSIS — F41.9 ANXIETY: Primary | ICD-10-CM

## 2025-07-10 DIAGNOSIS — R00.2 PALPITATIONS: ICD-10-CM

## 2025-07-10 DIAGNOSIS — J44.89 CHRONIC BRONCHIOLITIS (HCC): ICD-10-CM

## 2025-07-10 NOTE — PROGRESS NOTES
7/10/2025  Tamie Barboza (: 2000)  25 y.o.    ASSESSMENT and PLAN:  Tamie was seen today for follow-up.    Diagnoses and all orders for this visit:    Anxiety    Palpitations    Centrilobular emphysema (HCC)    Chronic bronchiolitis (HCC)    Less than 8 weeks gestation of pregnancy    Overall doing well.   Establishing with OB for US in a few weeks.   No bleeding or nausea.   Has stopped vaping. No cardiac symptoms.   Recommend continued hydration.   Continue daily prenatal    Return if symptoms worsen or fail to improve.    HPI    Currently about 5 wks pregnant.   Overall feeling pretty good. Significant other present.   Has scheduled with OB 2025.    Has upcoming appt with pulm for pulm nodule/cyst, chronic bronchiolitis.   Stopped vaping due to pregnancy and symptoms improved slightly.   Hx of eczema/asthma.     Saw cardiology for orthostatic hypotension. States she has felt better the past few weeks. No palpitations.     Was seeing PT for knee.   Review of Systems   Constitutional:  Negative for activity change, appetite change, fatigue, fever and unexpected weight change.   Respiratory:  Negative for cough, chest tightness, shortness of breath and wheezing.    Cardiovascular:  Negative for chest pain, palpitations and leg swelling.   Gastrointestinal:  Negative for constipation, diarrhea, nausea and vomiting.   Genitourinary: Negative.  Negative for difficulty urinating and dysuria.   Musculoskeletal: Negative.  Negative for gait problem.   Neurological: Negative.  Negative for dizziness, syncope, weakness, light-headedness, numbness and headaches.   Psychiatric/Behavioral: Negative.         Allergies, past medical history, family history, and social history reviewed and unchanged from previous encounter.     Current Outpatient Medications   Medication Sig Dispense Refill    Riley MV-Min w/Fe-Folate-DHA (PRENATAL COMPLETE PO) Take by mouth      omeprazole (PRILOSEC) 20 MG delayed release

## 2025-07-24 ENCOUNTER — OFFICE VISIT (OUTPATIENT)
Dept: PULMONOLOGY | Age: 25
End: 2025-07-24
Payer: MEDICAID

## 2025-07-24 VITALS
RESPIRATION RATE: 16 BRPM | SYSTOLIC BLOOD PRESSURE: 127 MMHG | DIASTOLIC BLOOD PRESSURE: 70 MMHG | HEART RATE: 81 BPM | OXYGEN SATURATION: 99 % | WEIGHT: 148 LBS | BODY MASS INDEX: 23.23 KG/M2 | TEMPERATURE: 97.7 F | HEIGHT: 67 IN

## 2025-07-24 DIAGNOSIS — J98.4 LUNG CYST: ICD-10-CM

## 2025-07-24 DIAGNOSIS — J21.9 BRONCHIOLITIS: Primary | ICD-10-CM

## 2025-07-24 PROCEDURE — 99204 OFFICE O/P NEW MOD 45 MIN: CPT | Performed by: INTERNAL MEDICINE

## 2025-07-24 PROCEDURE — G2211 COMPLEX E/M VISIT ADD ON: HCPCS | Performed by: INTERNAL MEDICINE

## 2025-07-24 NOTE — PROGRESS NOTES
MA Communication:  The following orders are received by verbal communication from Blanca Salcedo MD    Orders include:    Will get blood work  Follow up in November    
tablet, Take 1 tablet by mouth 2 times daily, Disp: 60 tablet, Rfl: 3       Test Results:  Radiology:  I have reviewed radiology images personally.                PFTs/Oxygen testing:              Sleep:        Cardiology     LAB  Lab Results   Component Value Date/Time    HGB 13.3 04/03/2025 10:15 AM     04/03/2025 10:15 AM    EOSABS 0.1 11/10/2024 06:31 PM    EOSABS 0.0 03/06/2024 11:17 PM    EOSABS 0.2 01/23/2023 05:11 AM    EOSABS 0.1 01/22/2023 07:25 PM    EOSABS 0.0 04/10/2022 06:00 AM     Lab Results   Component Value Date/Time    CREATININE 0.7 04/03/2025 10:15 AM    CALCIUM 9.3 04/03/2025 10:15 AM     No results found for: \"LABA1C\"  No results found for: \"C6KGQOBR\"   _________________________________________________________________________________________________________________________________  Vital Signs  Vitals:    07/24/25 1359   BP: 127/70   Pulse: 81   Resp: 16   Temp: 97.7 °F (36.5 °C)   SpO2: 99%            No data to display                Physical Exam:  General appearance: In no apparent distress.  HEENT: No oral thrush.  Mallampati score: 2  Cardiac: Normal S1 and S2.  Lungs: Good air entry bilaterally.  Abdomen: Soft.  Back & Extremities: Symmetric pulses with good perfusion.  Neurological: No focal deficit.      ________________________________________________________  Orders Placed This Encounter    ALLERGY SKIN TESTS,ALLERGENS     Standing Status:   Future     Expected Date:   7/24/2025     Expiration Date:   7/24/2026    Alpha-1-Antitrypsin w Phenotype     Standing Status:   Future     Expected Date:   7/24/2025     Expiration Date:   7/24/2026    ODESSA Reflex to Antibody Cascade     Standing Status:   Future     Expected Date:   7/24/2025     Expiration Date:   7/24/2026    Anti-DNA Antibody, Double-Stranded     Standing Status:   Future     Expected Date:   7/24/2025     Expiration Date:   7/24/2026    Hypersensitivity Pneumonitis Extended Panel     Standing Status:   Future

## 2025-07-24 NOTE — PATIENT INSTRUCTIONS
Reviewed the findings of the CAT scans and the differential diagnosis.  Also PFTs from November 2024 reviewed  No need for any treatment currently especially that the patient is 7 weeks pregnant and has improved significantly with her symptoms of bronchiolitis since April.  Discussed the importance of complete avoidance of vaping, smoking marijuana.  Patient already stopped since pregnancy.  Recommended high-quality mask while grooming animals  Sinus rinse provided to do daily for the sinus congestion and chronic snoring.  Will check alpha-1 antitrypsin, connective tissue disease serology, HP panel  If symptoms worsen in the future we will consider bronchoscopy, Singulair, azithromycin, inhaled steroid containing inhalers  15 albuterol as needed  Follow-up in November 2025      Health Maintenance/Preventive measures:        >>  Avoid smoking, vaping or secondhand exposure.  Avoid exposure to irritants, allergens as possible as well as contact with patients with infectious respiratory illness.        >>  Stay up-to-date with influenza & pneumonia vaccines, RSV, & COVID-19 vaccine as recommended by the Advisory Committee on Immunization Practices (ACIP)        >>  Healthy diet and activity as able.        >>  Acid reflux precautions: Head of bed elevation, avoiding tight clothes, avoiding big meals or snacking 3 hours before bedtime, targeting healthy weight.        >>  Practice sleep hygiene measures. Avoid driving or operating heavy machines if tired or sleepy.

## 2025-07-31 ENCOUNTER — OFFICE VISIT (OUTPATIENT)
Dept: OBGYN CLINIC | Age: 25
End: 2025-07-31

## 2025-07-31 VITALS
SYSTOLIC BLOOD PRESSURE: 117 MMHG | BODY MASS INDEX: 23.23 KG/M2 | HEART RATE: 91 BPM | WEIGHT: 148 LBS | OXYGEN SATURATION: 98 % | DIASTOLIC BLOOD PRESSURE: 77 MMHG | HEIGHT: 67 IN

## 2025-07-31 DIAGNOSIS — Z11.3 ROUTINE SCREENING FOR STI (SEXUALLY TRANSMITTED INFECTION): ICD-10-CM

## 2025-07-31 DIAGNOSIS — N92.6 MISSED MENSES: ICD-10-CM

## 2025-07-31 DIAGNOSIS — Z01.419 ENCNTR FOR GYN EXAM (GENERAL) (ROUTINE) W/O ABN FINDINGS: Primary | ICD-10-CM

## 2025-07-31 DIAGNOSIS — Z32.01 POSITIVE PREGNANCY TEST: ICD-10-CM

## 2025-07-31 ASSESSMENT — ENCOUNTER SYMPTOMS
DIARRHEA: 0
SORE THROAT: 0
ABDOMINAL PAIN: 0
NAUSEA: 0
CONSTIPATION: 0
VOMITING: 0
COUGH: 0
SHORTNESS OF BREATH: 0

## 2025-07-31 NOTE — PROGRESS NOTES
Annual Exam      CC:   Chief Complaint   Patient presents with    New Patient     NP: Amenorrhea: LMP 25, Last Pap 2024 [facility]. No Hx of abnromal        HPI:  25 y.o.  presents for her gynecologic annual exam.    Patient seen and examined. Doing well without complaints.     Stopped birth control in 2025.   Reports menses were regular, occurring monthly.  Lasting 4-5 days.  Moderate in flow. Cramps the first day.     Medical history includes GERD, possibly POTS (sees Cardiology), lung nodules and mild emphysema (follows with Pulmonology).  Surgical history includes EGD, esophageal dilation and T&A.    Obstetric history significant for  x1.  Denies history of shoulder dystocia, high order laceration or postpartum hemorrhage.   Pregnancy was complicated by Polyhydramnios (twice weekly testing for such).  Labored for 8 hours.  Spontaneous labor at 38 weeks.  8lbs.     Health Maintenance:  Birth control: None  Pregnancy plans: Currently with positive test  Safe relationship:  May 2025  Healthy diet: No specific plan  Exercise:  Stays active  Employment: Dog groomer     Screening:  Last pap smear: 2024  History of abnormal pap smears: Denies    Vaccines:  Gardasil vaccine: Has had  Flu vaccine: Has not had  COVID-19 vaccine: Has not had     Review of Systems:   Review of Systems   Constitutional:  Negative for chills and fever.   HENT:  Negative for congestion and sore throat.    Respiratory:  Negative for cough and shortness of breath.    Cardiovascular:  Negative for chest pain and palpitations.   Gastrointestinal:  Negative for abdominal pain, constipation, diarrhea, nausea and vomiting.   Genitourinary:  Positive for menstrual problem. Negative for dysuria, frequency, pelvic pain and vaginal discharge.   Neurological:  Negative for dizziness and headaches.   All other systems reviewed and are negative.      Primary Care Physician: Keiry Keith APRN -

## 2025-08-01 LAB
BACTERIA URNS QL MICRO: ABNORMAL /HPF
BILIRUB UR QL STRIP.AUTO: NEGATIVE
C TRACH DNA CVX QL NAA+PROBE: NEGATIVE
CLARITY UR: CLEAR
COLOR UR: YELLOW
EPI CELLS #/AREA URNS AUTO: 3 /HPF (ref 0–5)
GLUCOSE UR STRIP.AUTO-MCNC: NEGATIVE MG/DL
HGB UR QL STRIP.AUTO: NEGATIVE
HYALINE CASTS #/AREA URNS AUTO: 1 /LPF (ref 0–8)
KETONES UR STRIP.AUTO-MCNC: NEGATIVE MG/DL
LEUKOCYTE ESTERASE UR QL STRIP.AUTO: NEGATIVE
N GONORRHOEA DNA CERV MUCUS QL NAA+PROBE: NEGATIVE
NITRITE UR QL STRIP.AUTO: NEGATIVE
PH UR STRIP.AUTO: 7 [PH] (ref 5–8)
PROT UR STRIP.AUTO-MCNC: NEGATIVE MG/DL
RBC CLUMPS #/AREA URNS AUTO: 0 /HPF (ref 0–4)
SP GR UR STRIP.AUTO: 1.01 (ref 1–1.03)
UA DIPSTICK W REFLEX MICRO PNL UR: ABNORMAL
URN SPEC COLLECT METH UR: ABNORMAL
UROBILINOGEN UR STRIP-ACNC: 0.2 E.U./DL
WBC #/AREA URNS AUTO: 2 /HPF (ref 0–5)

## 2025-08-02 LAB — BACTERIA UR CULT: NORMAL

## 2025-08-14 ENCOUNTER — OFFICE VISIT (OUTPATIENT)
Dept: CARDIOLOGY CLINIC | Age: 25
End: 2025-08-14
Payer: MEDICAID

## 2025-08-14 VITALS
DIASTOLIC BLOOD PRESSURE: 62 MMHG | OXYGEN SATURATION: 97 % | HEIGHT: 67 IN | SYSTOLIC BLOOD PRESSURE: 116 MMHG | HEART RATE: 81 BPM | WEIGHT: 149.5 LBS | BODY MASS INDEX: 23.46 KG/M2

## 2025-08-14 DIAGNOSIS — R42 DIZZINESS: Primary | ICD-10-CM

## 2025-08-14 DIAGNOSIS — Z3A.10 10 WEEKS GESTATION OF PREGNANCY: ICD-10-CM

## 2025-08-14 DIAGNOSIS — R55 NEAR SYNCOPE: ICD-10-CM

## 2025-08-14 DIAGNOSIS — R00.2 PALPITATIONS: ICD-10-CM

## 2025-08-14 PROBLEM — Z3A.09 9 WEEKS GESTATION OF PREGNANCY: Status: ACTIVE | Noted: 2025-08-14

## 2025-08-14 PROCEDURE — 99214 OFFICE O/P EST MOD 30 MIN: CPT | Performed by: INTERNAL MEDICINE

## 2025-08-14 PROCEDURE — G2211 COMPLEX E/M VISIT ADD ON: HCPCS | Performed by: INTERNAL MEDICINE

## 2025-08-25 ENCOUNTER — TELEPHONE (OUTPATIENT)
Dept: OBGYN CLINIC | Age: 25
End: 2025-08-25

## 2025-09-04 ENCOUNTER — INITIAL PRENATAL (OUTPATIENT)
Dept: OBGYN CLINIC | Age: 25
End: 2025-09-04

## 2025-09-04 VITALS — BODY MASS INDEX: 23.71 KG/M2 | SYSTOLIC BLOOD PRESSURE: 124 MMHG | DIASTOLIC BLOOD PRESSURE: 68 MMHG | WEIGHT: 151.4 LBS

## 2025-09-04 DIAGNOSIS — J43.2 CENTRILOBULAR EMPHYSEMA (HCC): ICD-10-CM

## 2025-09-04 DIAGNOSIS — Z34.81 PRENATAL CARE, SUBSEQUENT PREGNANCY IN FIRST TRIMESTER: Primary | ICD-10-CM

## 2025-09-04 DIAGNOSIS — Z02.83 ENCOUNTER FOR DRUG SCREENING: ICD-10-CM

## 2025-09-04 DIAGNOSIS — K21.9 GASTROESOPHAGEAL REFLUX DISEASE, UNSPECIFIED WHETHER ESOPHAGITIS PRESENT: ICD-10-CM

## 2025-09-05 DIAGNOSIS — R76.8 ANTICENTROMERE ANTIBODIES PRESENT: ICD-10-CM

## 2025-09-05 DIAGNOSIS — J21.9 BRONCHIOLITIS: Primary | ICD-10-CM

## 2025-09-05 LAB
AMPHETAMINES UR QL SCN>1000 NG/ML: ABNORMAL
BARBITURATES UR QL SCN>200 NG/ML: ABNORMAL
BENZODIAZ UR QL SCN>200 NG/ML: ABNORMAL
BUPRENORPHINE+NOR UR QL SCN: ABNORMAL
CANNABINOIDS UR QL SCN>50 NG/ML: POSITIVE
COCAINE UR QL SCN: ABNORMAL
DRUG SCREEN COMMENT UR-IMP: ABNORMAL
FENTANYL SCREEN, URINE: ABNORMAL
METHADONE UR QL SCN>300 NG/ML: ABNORMAL
OPIATES UR QL SCN>300 NG/ML: ABNORMAL
OXYCODONE UR QL SCN: ABNORMAL
PCP UR QL SCN>25 NG/ML: ABNORMAL
PH UR STRIP: 7 [PH]